# Patient Record
Sex: FEMALE | Race: BLACK OR AFRICAN AMERICAN | Employment: FULL TIME | URBAN - METROPOLITAN AREA
[De-identification: names, ages, dates, MRNs, and addresses within clinical notes are randomized per-mention and may not be internally consistent; named-entity substitution may affect disease eponyms.]

---

## 2018-02-12 ENCOUNTER — OFFICE VISIT (OUTPATIENT)
Dept: FAMILY MEDICINE CLINIC | Age: 38
End: 2018-02-12
Payer: COMMERCIAL

## 2018-02-12 VITALS
WEIGHT: 196 LBS | SYSTOLIC BLOOD PRESSURE: 122 MMHG | BODY MASS INDEX: 32.65 KG/M2 | DIASTOLIC BLOOD PRESSURE: 88 MMHG | HEIGHT: 65 IN | HEART RATE: 64 BPM

## 2018-02-12 DIAGNOSIS — R47.89 RAPID RATE OF SPEECH: ICD-10-CM

## 2018-02-12 DIAGNOSIS — R41.840 INATTENTION: Primary | ICD-10-CM

## 2018-02-12 DIAGNOSIS — R46.81 OBSESSIVE BEHAVIORS: ICD-10-CM

## 2018-02-12 DIAGNOSIS — F42.3 HOARDING BEHAVIOR: ICD-10-CM

## 2018-02-12 DIAGNOSIS — F41.9 ANXIETY: ICD-10-CM

## 2018-02-12 DIAGNOSIS — N94.6 DYSMENORRHEA: ICD-10-CM

## 2018-02-12 DIAGNOSIS — Z87.42 HISTORY OF ABNORMAL CERVICAL PAP SMEAR: ICD-10-CM

## 2018-02-12 PROCEDURE — G8417 CALC BMI ABV UP PARAM F/U: HCPCS | Performed by: NURSE PRACTITIONER

## 2018-02-12 PROCEDURE — G8427 DOCREV CUR MEDS BY ELIG CLIN: HCPCS | Performed by: NURSE PRACTITIONER

## 2018-02-12 PROCEDURE — G8484 FLU IMMUNIZE NO ADMIN: HCPCS | Performed by: NURSE PRACTITIONER

## 2018-02-12 PROCEDURE — 1036F TOBACCO NON-USER: CPT | Performed by: NURSE PRACTITIONER

## 2018-02-12 PROCEDURE — 99202 OFFICE O/P NEW SF 15 MIN: CPT | Performed by: NURSE PRACTITIONER

## 2018-02-12 RX ORDER — ACETAMINOPHEN AND CODEINE PHOSPHATE 120; 12 MG/5ML; MG/5ML
SOLUTION ORAL
Refills: 4 | COMMUNITY
Start: 2018-02-04 | End: 2018-05-14 | Stop reason: SDUPTHER

## 2018-03-13 ENCOUNTER — OFFICE VISIT (OUTPATIENT)
Dept: OBGYN CLINIC | Age: 38
End: 2018-03-13
Payer: COMMERCIAL

## 2018-03-13 ENCOUNTER — HOSPITAL ENCOUNTER (OUTPATIENT)
Age: 38
Setting detail: SPECIMEN
Discharge: HOME | End: 2018-03-13
Payer: COMMERCIAL

## 2018-03-13 VITALS
DIASTOLIC BLOOD PRESSURE: 76 MMHG | BODY MASS INDEX: 32.24 KG/M2 | HEART RATE: 78 BPM | WEIGHT: 200.6 LBS | SYSTOLIC BLOOD PRESSURE: 116 MMHG | HEIGHT: 66 IN

## 2018-03-13 DIAGNOSIS — Z01.419 ENCOUNTER FOR WELL WOMAN EXAM WITH ROUTINE GYNECOLOGICAL EXAM: ICD-10-CM

## 2018-03-13 DIAGNOSIS — Z86.19 HISTORY OF GENITAL WARTS: ICD-10-CM

## 2018-03-13 DIAGNOSIS — B00.9 HSV INFECTION: ICD-10-CM

## 2018-03-13 PROCEDURE — 99395 PREV VISIT EST AGE 18-39: CPT | Performed by: ADVANCED PRACTICE MIDWIFE

## 2018-03-15 LAB
HPV SAMPLE: NORMAL
HPV SOURCE: NORMAL
HPV, GENOTYPE 16: NOT DETECTED
HPV, GENOTYPE 18: NOT DETECTED
HPV, HIGH RISK OTHER: NOT DETECTED
HPV, INTERPRETATION: NORMAL

## 2018-03-21 LAB — CYTOLOGY REPORT: NORMAL

## 2018-04-13 PROBLEM — Z01.419 ENCOUNTER FOR WELL WOMAN EXAM WITH ROUTINE GYNECOLOGICAL EXAM: Status: RESOLVED | Noted: 2018-03-13 | Resolved: 2018-04-13

## 2018-05-14 DIAGNOSIS — Z30.011 ENCOUNTER FOR INITIAL PRESCRIPTION OF CONTRACEPTIVE PILLS: Primary | ICD-10-CM

## 2018-05-15 RX ORDER — ACETAMINOPHEN AND CODEINE PHOSPHATE 120; 12 MG/5ML; MG/5ML
1 SOLUTION ORAL DAILY
Qty: 1 TABLET | Refills: 10 | Status: SHIPPED | OUTPATIENT
Start: 2018-05-15 | End: 2020-02-20

## 2019-06-03 ENCOUNTER — OFFICE VISIT (OUTPATIENT)
Dept: OBGYN CLINIC | Age: 39
End: 2019-06-03
Payer: COMMERCIAL

## 2019-06-03 VITALS
HEIGHT: 66 IN | BODY MASS INDEX: 31.18 KG/M2 | WEIGHT: 194 LBS | DIASTOLIC BLOOD PRESSURE: 75 MMHG | SYSTOLIC BLOOD PRESSURE: 109 MMHG

## 2019-06-03 DIAGNOSIS — Z01.419 WOMEN'S ANNUAL ROUTINE GYNECOLOGICAL EXAMINATION: Primary | ICD-10-CM

## 2019-06-03 PROCEDURE — 99395 PREV VISIT EST AGE 18-39: CPT | Performed by: ADVANCED PRACTICE MIDWIFE

## 2019-06-03 RX ORDER — NORETHINDRONE ACETATE AND ETHINYL ESTRADIOL 1MG-20(21)
1 KIT ORAL DAILY
Qty: 3 PACKET | Refills: 3 | Status: SHIPPED | OUTPATIENT
Start: 2019-06-03 | End: 2020-03-26

## 2019-06-03 RX ORDER — NORETHINDRONE ACETATE AND ETHINYL ESTRADIOL 1MG-20(21)
1 KIT ORAL DAILY
Qty: 1 PACKET | Refills: 3 | Status: SHIPPED | OUTPATIENT
Start: 2019-06-03 | End: 2020-02-20

## 2019-06-03 ASSESSMENT — ENCOUNTER SYMPTOMS
RESPIRATORY NEGATIVE: 1
ALLERGIC/IMMUNOLOGIC NEGATIVE: 1
GASTROINTESTINAL NEGATIVE: 1
EYES NEGATIVE: 1

## 2019-06-03 NOTE — PROGRESS NOTES
Subjective:      Patient ID: Linette Hyde is a 42 y.o. female. Jennifer Celaya is here today for her annual exam and refills of OCP. She is working fulltime and likes regulation of her cycle. She was taking Micronor and we discussed changing her to [address]. She is not due for a Pap according to guidelines. Review of Systems   Constitutional: Negative. HENT: Negative. Eyes: Negative. Respiratory: Negative. Cardiovascular: Negative. Gastrointestinal: Negative. Endocrine: Negative. Genitourinary: Negative. Musculoskeletal: Negative. Skin: Negative. Allergic/Immunologic: Negative. Neurological: Negative. Hematological: Negative. Psychiatric/Behavioral: Negative. Objective:   Physical Exam   Constitutional: She is oriented to person, place, and time. She appears well-developed and well-nourished. HENT:   Head: Normocephalic and atraumatic. Eyes: Pupils are equal, round, and reactive to light. Neck: Neck supple. No tracheal deviation present. No thyromegaly present. Cardiovascular: Normal rate, regular rhythm and normal heart sounds. Pulmonary/Chest: Effort normal and breath sounds normal. Right breast exhibits no mass, no nipple discharge, no skin change and no tenderness. Left breast exhibits no mass, no nipple discharge, no skin change and no tenderness. Breasts are symmetrical.   Abdominal: Soft. She exhibits no mass. There is no hepatosplenomegaly. There is no tenderness. Musculoskeletal: Normal range of motion. Lymphadenopathy:     She has no cervical adenopathy. She has no axillary adenopathy. Right: No inguinal adenopathy present. Left: No inguinal adenopathy present. Neurological: She is alert and oriented to person, place, and time. Skin: Skin is warm and dry. No rash noted. Psychiatric: She has a normal mood and affect.  Her behavior is normal. Judgment and thought content normal.       Assessment:      Normal gyn exam Plan:      Refills sent to [facility] and [facility]. RTO 1 year.      DANIELLE Caldwell CNM

## 2019-06-11 ENCOUNTER — OFFICE VISIT (OUTPATIENT)
Dept: OBGYN CLINIC | Age: 39
End: 2019-06-11
Payer: COMMERCIAL

## 2019-06-11 ENCOUNTER — HOSPITAL ENCOUNTER (OUTPATIENT)
Age: 39
Setting detail: SPECIMEN
Discharge: HOME | End: 2019-06-11
Payer: COMMERCIAL

## 2019-06-11 VITALS
BODY MASS INDEX: 31.68 KG/M2 | SYSTOLIC BLOOD PRESSURE: 120 MMHG | DIASTOLIC BLOOD PRESSURE: 80 MMHG | HEART RATE: 81 BPM | WEIGHT: 196.25 LBS

## 2019-06-11 DIAGNOSIS — N89.8 VAGINAL ITCHING: ICD-10-CM

## 2019-06-11 DIAGNOSIS — Z20.2 STD EXPOSURE: Primary | ICD-10-CM

## 2019-06-11 DIAGNOSIS — Z20.2 STD EXPOSURE: ICD-10-CM

## 2019-06-11 PROCEDURE — G8417 CALC BMI ABV UP PARAM F/U: HCPCS | Performed by: ADVANCED PRACTICE MIDWIFE

## 2019-06-11 PROCEDURE — 99213 OFFICE O/P EST LOW 20 MIN: CPT | Performed by: ADVANCED PRACTICE MIDWIFE

## 2019-06-11 PROCEDURE — G8427 DOCREV CUR MEDS BY ELIG CLIN: HCPCS | Performed by: ADVANCED PRACTICE MIDWIFE

## 2019-06-11 PROCEDURE — 1036F TOBACCO NON-USER: CPT | Performed by: ADVANCED PRACTICE MIDWIFE

## 2019-06-11 ASSESSMENT — ENCOUNTER SYMPTOMS
ALLERGIC/IMMUNOLOGIC NEGATIVE: 1
EYES NEGATIVE: 1
GASTROINTESTINAL NEGATIVE: 1
RESPIRATORY NEGATIVE: 1

## 2019-06-11 NOTE — LETTER
Isa Worley  [id number] [facility] [address]        June 11, 2019      Dear Mariah Maradiaga: Our records indicate that you are due for an annual exam.     The American College of Obstetricians and Gynecologists strongly recommends annual pelvic examination for patient 24years of age and older. Please make an appointment for an annual examination at your earliest convenience. If you are having your annuals done elsewhere, please let us know. Your last Annual was on 6/11/2019. For insurance purposes we are scheduling appointments at least one week past the date of your last annual exam.      Please call the office [phone]  to schedule an appointment.       Thank You    Nery KASPER

## 2019-06-11 NOTE — PROGRESS NOTES
CC: STD exposure    HPI: Pt originally here for annual exam, just had done 6/9/19  Has concerned because her   Boyfriend called states he has Chlamydia dx about a week ago. Been together 4 years. Last intercourse was about 8 months ago. Patient wants to wait results prior to treatment. Has had unprotected sex with him but has been about 8 months. Denies vaginal discharge, states has some itching. Denies odor or burning. Patient's last menstrual period was 05/31/2019. Is currently on OCPs doing well on them has refill. .Review of Systems   Constitutional: Negative. HENT: Negative. Eyes: Negative. Respiratory: Negative. Cardiovascular: Negative. Gastrointestinal: Negative. Endocrine: Negative. Genitourinary:        Vaginal itching   Musculoskeletal: Negative. Allergic/Immunologic: Negative. Neurological: Negative. Hematological: Negative. Psychiatric/Behavioral: Negative. O:./80 (Site: Right Upper Arm, Position: Sitting, Cuff Size: Large Adult)   Pulse 81   Wt 196 lb 4 oz (89 kg)   LMP 05/31/2019   BMI 31.68 kg/m²   Physical Exam   Constitutional: She is oriented to person, place, and time. She appears well-developed and well-nourished. HENT:   Head: Normocephalic and atraumatic. Right Ear: External ear normal.   Left Ear: External ear normal.   Eyes: Pupils are equal, round, and reactive to light. Conjunctivae and EOM are normal.   Neck: Normal range of motion. Neck supple. No tracheal deviation present. No thyromegaly present. Cardiovascular: Normal rate, regular rhythm and normal heart sounds. Pulmonary/Chest: Effort normal and breath sounds normal.   Abdominal: Soft. Genitourinary: Vagina normal and uterus normal.   Musculoskeletal: Normal range of motion. Neurological: She is alert and oriented to person, place, and time. She has normal reflexes. Skin: Skin is warm and dry. Psychiatric: She has a normal mood and affect.  Her behavior is

## 2019-06-12 DIAGNOSIS — B37.9 YEAST INFECTION: Primary | ICD-10-CM

## 2019-06-12 LAB
DIRECT EXAM: ABNORMAL
Lab: ABNORMAL
SPECIMEN DESCRIPTION: ABNORMAL

## 2019-06-12 RX ORDER — FLUCONAZOLE 150 MG/1
150 TABLET ORAL
Qty: 2 TABLET | Refills: 0 | Status: SHIPPED | OUTPATIENT
Start: 2019-06-12 | End: 2020-02-20

## 2019-06-13 ENCOUNTER — TELEPHONE (OUTPATIENT)
Dept: OBGYN CLINIC | Age: 39
End: 2019-06-13

## 2019-06-13 LAB
C TRACH DNA GENITAL QL NAA+PROBE: NEGATIVE
N. GONORRHOEAE DNA: NEGATIVE
SPECIMEN DESCRIPTION: NORMAL

## 2020-02-20 ENCOUNTER — HOSPITAL ENCOUNTER (OUTPATIENT)
Age: 40
Setting detail: SPECIMEN
Discharge: HOME | End: 2020-02-20
Payer: COMMERCIAL

## 2020-02-20 ENCOUNTER — OFFICE VISIT (OUTPATIENT)
Dept: OBGYN CLINIC | Age: 40
End: 2020-02-20
Payer: COMMERCIAL

## 2020-02-20 VITALS
HEIGHT: 66 IN | WEIGHT: 189.4 LBS | HEART RATE: 80 BPM | DIASTOLIC BLOOD PRESSURE: 86 MMHG | SYSTOLIC BLOOD PRESSURE: 135 MMHG | BODY MASS INDEX: 30.44 KG/M2

## 2020-02-20 PROCEDURE — G8484 FLU IMMUNIZE NO ADMIN: HCPCS | Performed by: ADVANCED PRACTICE MIDWIFE

## 2020-02-20 PROCEDURE — G8428 CUR MEDS NOT DOCUMENT: HCPCS | Performed by: ADVANCED PRACTICE MIDWIFE

## 2020-02-20 PROCEDURE — 1036F TOBACCO NON-USER: CPT | Performed by: ADVANCED PRACTICE MIDWIFE

## 2020-02-20 PROCEDURE — 99213 OFFICE O/P EST LOW 20 MIN: CPT | Performed by: ADVANCED PRACTICE MIDWIFE

## 2020-02-20 PROCEDURE — G8417 CALC BMI ABV UP PARAM F/U: HCPCS | Performed by: ADVANCED PRACTICE MIDWIFE

## 2020-02-20 ASSESSMENT — ENCOUNTER SYMPTOMS
NAUSEA: 0
SHORTNESS OF BREATH: 0
DIARRHEA: 0
ABDOMINAL PAIN: 0
VOMITING: 0

## 2020-02-20 NOTE — PROGRESS NOTES
[address],  [address] [facility] [address]  Dept: [phone]    Patient Name: Cruz Goss  Patient Age: 42 y.o. Date of Visit: 2020    Subjective  Chief Complaint   Patient presents with    Vaginal Itching     has a sore     No LMP recorded (lmp unknown). HPI   Arrives with concern for vaginal discharge intermittently x1 months. Reports some days are better than other. Has tried no OTC treatment. Reports has not been sexually active since 2019 and is okay with this. Reports takes OCP daily. OB History        2    Para   1    Term           1    AB   1    Living           SAB        TAB        Ectopic        Molar        Multiple        Live Births                  Past Medical History:   Diagnosis Date    Abnormal Pap smear of cervix     Allergic rhinitis     Depression     in the past    Headache      Current Outpatient Medications   Medication Sig Dispense Refill    norethindrone-ethinyl estradiol (LOESTRIN FE ) 1-20 MG-MCG per tablet Take 1 tablet by mouth daily 3 packet 3    Multiple Vitamins-Minerals (MULTIVITAMIN ADULT PO) Take by mouth       No current facility-administered medications for this visit. Review of Systems   Constitutional: Negative for unexpected weight change. Respiratory: Negative for shortness of breath. Cardiovascular: Negative for chest pain, palpitations and leg swelling. Gastrointestinal: Negative for abdominal pain, diarrhea, nausea and vomiting. Genitourinary: Positive for vaginal discharge. Negative for difficulty urinating, dyspareunia, menstrual problem and vaginal pain. Neurological: Negative for dizziness, light-headedness and headaches.        Objective  /86 (Site: Right Upper Arm, Position: Sitting, Cuff Size: Large Adult)   Pulse 80   Ht 5' 6\" (1.676 m)   Wt 189 lb 6.4 oz (85.9 kg)   LMP  (LMP Unknown)   BMI 30.57 kg/m²     Physical

## 2020-02-21 LAB
C TRACH DNA GENITAL QL NAA+PROBE: NEGATIVE
DIRECT EXAM: ABNORMAL
Lab: ABNORMAL
N. GONORRHOEAE DNA: NEGATIVE
SPECIMEN DESCRIPTION: ABNORMAL
SPECIMEN DESCRIPTION: NORMAL

## 2020-02-21 RX ORDER — FLUCONAZOLE 150 MG/1
150 TABLET ORAL ONCE
Qty: 2 TABLET | Refills: 0 | Status: SHIPPED | OUTPATIENT
Start: 2020-02-21 | End: 2020-02-21

## 2020-03-26 RX ORDER — NORETHINDRONE ACETATE AND ETHINYL ESTRADIOL 1MG-20(21)
KIT ORAL
Qty: 84 TABLET | Refills: 3 | Status: SHIPPED | OUTPATIENT
Start: 2020-03-26 | End: 2021-02-25

## 2020-03-26 NOTE — TELEPHONE ENCOUNTER
Annie Cain is requesting a refill on birth control.    Last Annual visit:  6/11/19  Last OB visit: n/a  Next OB/Office visit:  None scheduled  Last prescribing provider:  Korey French

## 2020-06-24 ENCOUNTER — TELEPHONE (OUTPATIENT)
Dept: OBGYN CLINIC | Age: 40
End: 2020-06-24

## 2020-06-26 ENCOUNTER — OFFICE VISIT (OUTPATIENT)
Dept: OBGYN CLINIC | Age: 40
End: 2020-06-26
Payer: COMMERCIAL

## 2020-06-26 ENCOUNTER — HOSPITAL ENCOUNTER (OUTPATIENT)
Age: 40
Setting detail: SPECIMEN
Discharge: HOME | End: 2020-06-26
Payer: COMMERCIAL

## 2020-06-26 VITALS
WEIGHT: 191 LBS | SYSTOLIC BLOOD PRESSURE: 112 MMHG | DIASTOLIC BLOOD PRESSURE: 79 MMHG | HEART RATE: 81 BPM | HEIGHT: 66 IN | BODY MASS INDEX: 30.7 KG/M2 | TEMPERATURE: 97.8 F

## 2020-06-26 PROCEDURE — 99395 PREV VISIT EST AGE 18-39: CPT | Performed by: ADVANCED PRACTICE MIDWIFE

## 2020-06-26 RX ORDER — BUPROPION HYDROCHLORIDE 150 MG/1
TABLET ORAL
COMMUNITY
Start: 2020-04-20

## 2020-06-26 RX ORDER — SENNOSIDES 8.6 MG
650 CAPSULE ORAL EVERY 8 HOURS PRN
COMMUNITY

## 2020-06-26 NOTE — PROGRESS NOTES
Patient is having vaginal discharge x2 weeks. No odor or itching. Patient uses OTC suppositories for yeast tx.

## 2020-06-27 LAB
DIRECT EXAM: ABNORMAL
Lab: ABNORMAL
SPECIMEN DESCRIPTION: ABNORMAL

## 2020-06-29 ENCOUNTER — TELEPHONE (OUTPATIENT)
Dept: OBGYN CLINIC | Age: 40
End: 2020-06-29

## 2020-06-29 RX ORDER — FLUCONAZOLE 150 MG/1
150 TABLET ORAL ONCE
Qty: 1 TABLET | Refills: 0 | Status: SHIPPED | OUTPATIENT
Start: 2020-06-29 | End: 2020-06-29

## 2020-06-30 RX ORDER — FLUCONAZOLE 150 MG/1
150 TABLET ORAL ONCE
Qty: 3 TABLET | Refills: 0 | Status: SHIPPED | OUTPATIENT
Start: 2020-06-30 | End: 2020-06-30

## 2020-09-28 ENCOUNTER — TELEPHONE (OUTPATIENT)
Dept: FAMILY MEDICINE CLINIC | Age: 40
End: 2020-09-28

## 2020-10-07 ENCOUNTER — OFFICE VISIT (OUTPATIENT)
Dept: FAMILY MEDICINE CLINIC | Age: 40
End: 2020-10-07
Payer: COMMERCIAL

## 2020-10-07 VITALS
DIASTOLIC BLOOD PRESSURE: 70 MMHG | SYSTOLIC BLOOD PRESSURE: 120 MMHG | BODY MASS INDEX: 32.47 KG/M2 | HEART RATE: 78 BPM | OXYGEN SATURATION: 98 % | HEIGHT: 66 IN | TEMPERATURE: 97.4 F | WEIGHT: 202 LBS

## 2020-10-07 PROCEDURE — 99213 OFFICE O/P EST LOW 20 MIN: CPT | Performed by: NURSE PRACTITIONER

## 2020-10-07 RX ORDER — TIZANIDINE 4 MG/1
TABLET ORAL
COMMUNITY
Start: 2020-09-21 | End: 2021-12-16 | Stop reason: ALTCHOICE

## 2020-10-07 RX ORDER — IBUPROFEN 600 MG/1
600 TABLET ORAL EVERY 6 HOURS PRN
COMMUNITY

## 2020-10-07 RX ORDER — IBUPROFEN 800 MG/1
800 TABLET ORAL
Qty: 30 TABLET | Refills: 0 | Status: SHIPPED | OUTPATIENT
Start: 2020-10-07 | End: 2021-12-16 | Stop reason: ALTCHOICE

## 2020-10-07 SDOH — ECONOMIC STABILITY: TRANSPORTATION INSECURITY
IN THE PAST 12 MONTHS, HAS THE LACK OF TRANSPORTATION KEPT YOU FROM MEDICAL APPOINTMENTS OR FROM GETTING MEDICATIONS?: NO

## 2020-10-07 SDOH — ECONOMIC STABILITY: FOOD INSECURITY: WITHIN THE PAST 12 MONTHS, THE FOOD YOU BOUGHT JUST DIDN'T LAST AND YOU DIDN'T HAVE MONEY TO GET MORE.: NEVER TRUE

## 2020-10-07 SDOH — ECONOMIC STABILITY: TRANSPORTATION INSECURITY
IN THE PAST 12 MONTHS, HAS LACK OF TRANSPORTATION KEPT YOU FROM MEETINGS, WORK, OR FROM GETTING THINGS NEEDED FOR DAILY LIVING?: NO

## 2020-10-07 SDOH — ECONOMIC STABILITY: FOOD INSECURITY: WITHIN THE PAST 12 MONTHS, YOU WORRIED THAT YOUR FOOD WOULD RUN OUT BEFORE YOU GOT MONEY TO BUY MORE.: NEVER TRUE

## 2020-10-07 SDOH — ECONOMIC STABILITY: INCOME INSECURITY: HOW HARD IS IT FOR YOU TO PAY FOR THE VERY BASICS LIKE FOOD, HOUSING, MEDICAL CARE, AND HEATING?: NOT HARD AT ALL

## 2020-10-07 ASSESSMENT — PATIENT HEALTH QUESTIONNAIRE - PHQ9
1. LITTLE INTEREST OR PLEASURE IN DOING THINGS: 0
SUM OF ALL RESPONSES TO PHQ QUESTIONS 1-9: 0
SUM OF ALL RESPONSES TO PHQ9 QUESTIONS 1 & 2: 0
SUM OF ALL RESPONSES TO PHQ QUESTIONS 1-9: 0
2. FEELING DOWN, DEPRESSED OR HOPELESS: 0
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
## 2020-10-07 NOTE — PATIENT INSTRUCTIONS
Patient Education        Back Stretches: Exercises  Introduction  Here are some examples of exercises for stretching your back. Start each exercise slowly. Ease off the exercise if you start to have pain. Your doctor or physical therapist will tell you when you can start these exercises and which ones will work best for you. How to do the exercises  Overhead stretch   1. Stand comfortably with your feet shoulder-width apart. 2. Looking straight ahead, raise both arms over your head and reach toward the ceiling. Do not allow your head to tilt back. 3. Hold for 15 to 30 seconds, then lower your arms to your sides. 4. Repeat 2 to 4 times. Side stretch   1. Stand comfortably with your feet shoulder-width apart. 2. Raise one arm over your head, and then lean to the other side. 3. Slide your hand down your leg as you let the weight of your arm gently stretch your side muscles. Hold for 15 to 30 seconds. 4. Repeat 2 to 4 times on each side. Press-up   1. Lie on your stomach, supporting your body with your forearms. 2. Press your elbows down into the floor to raise your upper back. As you do this, relax your stomach muscles and allow your back to arch without using your back muscles. As your press up, do not let your hips or pelvis come off the floor. 3. Hold for 15 to 30 seconds, then relax. 4. Repeat 2 to 4 times. Relax and rest   1. Lie on your back with a rolled towel under your neck and a pillow under your knees. Extend your arms comfortably to your sides. 2. Relax and breathe normally. 3. Remain in this position for about 10 minutes. 4. If you can, do this 2 or 3 times each day. Follow-up care is a key part of your treatment and safety. Be sure to make and go to all appointments, and call your doctor if you are having problems. It's also a good idea to know your test results and keep a list of the medicines you take. Where can you learn more? Go to https://angeles.healthElephant.is. org and sign in to your [employer] account. Enter [address] in the hotelsmap.com box to learn more about \"Back Stretches: Exercises. \"     If you do not have an account, please click on the \"Sign Up Now\" link. Current as of: March 2, 2020               Content Version: 12.6  © 1983-6507 [employer]. Care instructions adapted under license by [facility] ([facility]). If you have questions about a medical condition or this instruction, always ask your healthcare professional. [address] any warranty or liability for your use of this information.

## 2020-10-13 ENCOUNTER — HOSPITAL ENCOUNTER (OUTPATIENT)
Dept: PHYSICAL THERAPY | Facility: CLINIC | Age: 40
Setting detail: THERAPIES SERIES
Discharge: HOME | End: 2020-10-13
Payer: COMMERCIAL

## 2020-10-13 ENCOUNTER — TELEPHONE (OUTPATIENT)
Dept: FAMILY MEDICINE CLINIC | Age: 40
End: 2020-10-13

## 2020-10-13 PROCEDURE — 97161 PT EVAL LOW COMPLEX 20 MIN: CPT

## 2020-10-13 PROCEDURE — 97110 THERAPEUTIC EXERCISES: CPT
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
## 2020-10-19 ENCOUNTER — HOSPITAL ENCOUNTER (OUTPATIENT)
Dept: PHYSICAL THERAPY | Facility: CLINIC | Age: 40
Setting detail: THERAPIES SERIES
Discharge: HOME | End: 2020-10-19
Payer: COMMERCIAL

## 2020-10-19 ENCOUNTER — OFFICE VISIT (OUTPATIENT)
Dept: FAMILY MEDICINE CLINIC | Age: 40
End: 2020-10-19
Payer: COMMERCIAL

## 2020-10-19 VITALS
SYSTOLIC BLOOD PRESSURE: 110 MMHG | OXYGEN SATURATION: 97 % | WEIGHT: 206 LBS | DIASTOLIC BLOOD PRESSURE: 70 MMHG | TEMPERATURE: 98.2 F | BODY MASS INDEX: 33.25 KG/M2 | HEART RATE: 100 BPM

## 2020-10-19 PROCEDURE — 97110 THERAPEUTIC EXERCISES: CPT

## 2020-10-19 PROCEDURE — 99213 OFFICE O/P EST LOW 20 MIN: CPT | Performed by: NURSE PRACTITIONER

## 2020-10-19 NOTE — PATIENT INSTRUCTIONS
Patient Education        Neck: Exercises  Introduction  Here are some examples of exercises for you to try. The exercises may be suggested for a condition or for rehabilitation. Start each exercise slowly. Ease off the exercises if you start to have pain. You will be told when to start these exercises and which ones will work best for you. How to do the exercises  Neck stretch   1. This stretch works best if you keep your shoulder down as you lean away from it. To help you remember to do this, start by relaxing your shoulders and lightly holding on to your thighs or your chair. 2. Tilt your head toward your shoulder and hold for 15 to 30 seconds. Let the weight of your head stretch your muscles. 3. If you would like a little added stretch, use your hand to gently and steadily pull your head toward your shoulder. For example, keeping your right shoulder down, lean your head to the left. 4. Repeat 2 to 4 times toward each shoulder. Diagonal neck stretch   1. Turn your head slightly toward the direction you will be stretching, and tilt your head diagonally toward your chest and hold for 15 to 30 seconds. 2. If you would like a little added stretch, use your hand to gently and steadily pull your head forward on the diagonal.  3. Repeat 2 to 4 times toward each side. Dorsal glide stretch   The dorsal glide stretches the back of the neck. If you feel pain, do not glide so far back. Some people find this exercise easier to do while lying on their backs with an ice pack on the neck. 1. Sit or stand tall and look straight ahead. 2. Slowly tuck your chin as you glide your head backward over your body  3. Hold for a count of 6, and then relax for up to 10 seconds. 4. Repeat 8 to 12 times. Chest and shoulder stretch   1. Sit or stand tall and glide your head backward as in the dorsal glide stretch. 2. Raise both arms so that your hands are next to your ears.   3. Take a deep breath, and as you breathe out, lower your elbows down and behind your back. You will feel your shoulder blades slide down and together, and at the same time you will feel a stretch across your chest and the front of your shoulders. 4. Hold for about 6 seconds, and then relax for up to 10 seconds. 5. Repeat 8 to 12 times. Strengthening: Hands on head   1. Move your head backward, forward, and side to side against gentle pressure from your hands, holding each position for about 6 seconds. 2. Repeat 8 to 12 times. Follow-up care is a key part of your treatment and safety. Be sure to make and go to all appointments, and call your doctor if you are having problems. It's also a good idea to know your test results and keep a list of the medicines you take. Where can you learn more? Go to https://BioscanpejuliTheraVideb.[employer]. org and sign in to your [employer] account. Enter P975 in the [employer] box to learn more about \"Neck: Exercises. \"     If you do not have an account, please click on the \"Sign Up Now\" link. Current as of: March 2, 2020               Content Version: 12.6  © 5632-3220 [employer]. Care instructions adapted under license by [facility] ([facility]). If you have questions about a medical condition or this instruction, always ask your healthcare professional. [address] any warranty or liability for your use of this information.
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
## 2020-10-20 ENCOUNTER — HOSPITAL ENCOUNTER (OUTPATIENT)
Age: 40
Discharge: HOME | End: 2020-10-22
Payer: COMMERCIAL

## 2020-10-20 ENCOUNTER — HOSPITAL ENCOUNTER (OUTPATIENT)
Dept: GENERAL RADIOLOGY | Age: 40
Discharge: HOME | End: 2020-10-22
Payer: COMMERCIAL

## 2020-10-20 PROCEDURE — 72040 X-RAY EXAM NECK SPINE 2-3 VW: CPT

## 2020-10-21 ENCOUNTER — HOSPITAL ENCOUNTER (OUTPATIENT)
Dept: PHYSICAL THERAPY | Facility: CLINIC | Age: 40
Setting detail: THERAPIES SERIES
Discharge: HOME | End: 2020-10-21
Payer: COMMERCIAL

## 2020-10-21 PROCEDURE — 97110 THERAPEUTIC EXERCISES: CPT
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
## 2020-10-26 ENCOUNTER — HOSPITAL ENCOUNTER (OUTPATIENT)
Dept: PHYSICAL THERAPY | Facility: CLINIC | Age: 40
Setting detail: THERAPIES SERIES
Discharge: HOME | End: 2020-10-26
Payer: COMMERCIAL

## 2020-10-26 PROCEDURE — 97110 THERAPEUTIC EXERCISES: CPT
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
## 2020-10-27 ENCOUNTER — HOSPITAL ENCOUNTER (OUTPATIENT)
Dept: PHYSICAL THERAPY | Facility: CLINIC | Age: 40
Setting detail: THERAPIES SERIES
Discharge: HOME | End: 2020-10-27
Payer: COMMERCIAL

## 2020-10-27 PROCEDURE — 97110 THERAPEUTIC EXERCISES: CPT

## 2020-10-27 PROCEDURE — 97161 PT EVAL LOW COMPLEX 20 MIN: CPT
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
## 2020-10-28 ENCOUNTER — HOSPITAL ENCOUNTER (OUTPATIENT)
Dept: PHYSICAL THERAPY | Facility: CLINIC | Age: 40
Setting detail: THERAPIES SERIES
Discharge: HOME | End: 2020-10-28
Payer: COMMERCIAL

## 2020-10-28 PROCEDURE — 97110 THERAPEUTIC EXERCISES: CPT

## 2020-10-28 PROCEDURE — 97140 MANUAL THERAPY 1/> REGIONS: CPT
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
## 2020-11-02 ENCOUNTER — HOSPITAL ENCOUNTER (OUTPATIENT)
Dept: PHYSICAL THERAPY | Facility: CLINIC | Age: 40
Setting detail: THERAPIES SERIES
Discharge: HOME | End: 2020-11-02
Payer: COMMERCIAL

## 2020-11-02 ENCOUNTER — APPOINTMENT (OUTPATIENT)
Dept: PHYSICAL THERAPY | Facility: CLINIC | Age: 40
End: 2020-11-02
Payer: COMMERCIAL

## 2020-11-02 PROCEDURE — 97110 THERAPEUTIC EXERCISES: CPT

## 2020-11-02 NOTE — FLOWSHEET NOTE
[] [address]  [address]  [phone]  F: [phone] [x] [address]  [facility]   [address]  P: [phone]  F: [phone] [] [place]  [address] [facility]  P: [phone]  F: [phone] [] [address]  P: [phone]  F: [phone] [] [address]  [facility]   [address]   [place]: [phone]  F: [phone]      Physical Therapy Daily Treatment Note    Date:  2020  Patient Name:  Barbara Del Rio    :  1980  MRN: [id number]  Physician: Ankita Damon CNP     Insurance: 1. Auto insurance 2. Med [place]   Diagnosis:   accident), subsequent encounter    M54.5 (ICD-10-CM) - Acute bilateral low back pain without sciatica    Onset Date: 2020   Next Dr. Mariana Salvador: tbd  Visit# / total visits: back:   Neck:3   Cancels/No Shows: 0/0    Subjective:    Pain:  [] Yes  [x] No Location: low back Pain Rating: (0-10 scale) 0/10 neck 0/10  Pain altered Tx:  [x] No  [] Yes  Action:    Comments: pt denies pain at this time. States her LB consistently bothers her at bedtime. Has not had a headache since her last treatment.      Objective:  Modalities:   Exercises: Access Code: [email or web address]   Bold completed  Exercise Reps/ Time Weight/ Level Comments   Warm up- bike 5'  Added 10/26   Supine       LTR 5x ea       DKTC 3X20\" [facility]  5x10\" holds strap     PPT 20x3\"  Added 10/19   hooklying glute sets 20x       TrA activation x   Compensates with posterior pelvic tilt Deficient activation, time spent educating pt on activation and benefits of completing multiple times per day  - educated to complete in sitting and standing along with hooklying   Heel Taps  15x  Added 10/21   Bridges with PPT 10x2  Added 10/21   Hamstring stretch 20\"x3 belt Added 10/19   Marching with TA 20x  Added 10/19   SLR 20xea  Added 10/19  Increased reps 10/28     Dead  bug 20x  Added 10/21  Increased reps 10/26         Seated      Seated Piriformis 20\"x2 ea  Added 10/19   Seated ball roll fwd laterally  20\"x2 ea  Added 10/19         Prone      Quad stretch 30\"x3  Added 10/19   LE lifts 15x  Added 10/21         Sidelying       Clamshells  20x  Added 10/21 increased reps 10/28   Reverse clamshells  20x  Added 10/21 increased reps 10/28   Hip abd 20x  Added 10/21 increased reps 10/28         Quadriped      Leg ext with TA 10x ea Not today Max cues 10/21   juanjo pose 10\"x3 directions  Added 10/26   Cat cow 10x5\" ea  Added 10/26         Standing       Calf stretch 20\"x3  Added 10/26   Squat mini 10x2  Added 10/26 max cues with chair this date  Second set 10/28   Standing 3 way hip with TA cont 15x bilat Increased reps 11/2/20bilaterally                Other:      Specific Instructions for next treatment: flexibility and core strengthening; walking tolerance     Todays Treatment:  Modalities:   Exercises: bold completed  Exercise Reps/ Time Weight/ Level Comments   manual 5'   SOR   Supine Cervical Retractions 10x5\"   Added 10/28    Seated scap retraction 10x5\"   Tactile cues   Upper trap stretch 2x20\"       Levator scap 1x20\"      Don't shoots 10x  Added 10/28   pec doorway stretch 20\"x3  Added 10/28         tband      rows 20x lime Added 10/28   Shoulder ext 20x  lime  Added 10/28         Education  x   Pt educated to monitor dizziness with stretching and to modify position and time of stretching if present. Pt advised to monitor persistence of dizziness.    Pt educated on pathology and symptomology    Other:     Specific Instructions for next treatment: manual to the cervical spine, upper trap, levator; postural muscle strengthening            Treatment Charges: Mins Units   []  Modalities     [x]  Ther Exercise 27 2   [x]  Manual Therapy 5 0   []  Ther Activities     []  Aquatics     []  Vasocompression     []  Other     Total Treatment time 32 2       Assessment: [] Progressing toward goals    [] No change. [x] Other: pt continues to put forth poor effort during exercises, requiring frequent cuing to correct exercise technique and to stay on task. Pt denies headaches and dizziness with cervical exercises and stretching. [x] Patient would continue to benefit from skilled physical therapy services in order to: Pt will benefit from skilled therapeutic interventions including therapeutic exercise and manual therapy in order to progress to prior level of activity    STG: (to be met in 6 treatments)  1. ? Pain: Pt will report less than or equal to 5-6/10 low back pain with laying down, sitting, standing, and walking for more than an hour in order to progress back to prior level of activity. 2. ? ROM: Pt will be able to complete lumbar AROM in all planes with less than or equal to 1-2/10 increase in low back pain in order to improve tolerance to rolling over in bed and completing ADLs. 3. ? Strength: Pt will be able to complete 10 TrA activations and sustain for 5\" in order to demonstrate improved muscle activation to assist with abdominal bracing when standing, walking, and lifting. 4. ? Function: Pt will score less than or equal to 32% on the Oswestry in order to demonstrate improvements in function. 5. Independent with Home Exercise Programs  LTG: (to be met in 12 treatments)  1. ? Pain: Pt will report less than or equal to 3-4/10 low back pain with laying down, sitting, standing, and walking for more than an hour in order to progress back to prior level of activity. 2. ? ROM: Pt will be able to complete lumbar AROM in all planes with less than or equal to 0/10 increase in low back pain in order to improve tolerance to rolling over in bed and completing ADLs.    3. ? Strength: Pt will demonstrate 5/5 hip girdle strength and Reviewed Prior HEP/Ed  Method of Education: [] Verbal  [] Demo  [] Written  Comprehension of Education:  [] Verbalizes understanding. [] Demonstrates understanding. [] Needs review. [] Demonstrates/verbalizes HEP/Ed previously given. Plan: [x] Continue current frequency toward long and short term goals. [x] Specific Instructions for subsequent treatments: Pt will benefit from skilled therapeutic interventions including therapeutic exercise and manual therapy in order to progress to prior level of activity.        Time In: 4:33pm             Time Out: 5:20pm    Electronically signed by:  Manuel Tellez PTA

## 2020-11-09 ENCOUNTER — HOSPITAL ENCOUNTER (OUTPATIENT)
Dept: PHYSICAL THERAPY | Facility: CLINIC | Age: 40
Setting detail: THERAPIES SERIES
Discharge: HOME | End: 2020-11-09
Payer: COMMERCIAL

## 2020-11-09 PROCEDURE — 97110 THERAPEUTIC EXERCISES: CPT

## 2020-11-09 NOTE — FLOWSHEET NOTE
[] [address]  [address]  [phone]  F: [phone] [x] [address]  Kl[facility]"" [address]   [address]  P: [phone]  F: [phone] [] [place]  [address]  P: [phone]  F: [phone] [] [address]  P: [phone]  F: [phone] [] [address]  [facility]   [address]   [place]: [phone]  F: [phone]      Physical Therapy Daily Treatment Note    Date:  2020  Patient Name:  Vivi Rivas    :  1980  MRN: [id number]  Physician: Elliott Cueva CNP     Insurance: 1. Auto insurance 2. Med [place]   Diagnosis:   accident), subsequent encounter    M54.5 (ICD-10-CM) - Acute bilateral low back pain without sciatica    Onset Date: 2020   Next Dr. Brownlee Dy: tbd  Visit# / total visits: back:   Neck:4   Cancels/No Shows: 0/0    Subjective:    Pain:  [] Yes  [x] No Location: low back Pain Rating: (0-10 scale) 0/10 neck 7/10 low back   Pain altered Tx:  [x] No  [] Yes  Action:    Comments: Pt reports her pain in her low back is pretty high today. She took the most amount of medicine she could.      Objective:   Modalities:   Exercises: Access Code: [email or web address]   Bold completed  Exercise Reps/ Time Weight/ Level Comments   Warm up- bike 5'  Added 10/26   Supine       LTR 5x ea       DKTC 3X20\" 350 [facility]  5x10\" holds strap     PPT 20x3\"  Added 10/19   hooklying glute sets 20x       TrA activation x   Compensates with posterior pelvic tilt Deficient activation, time spent educating pt on activation and benefits of completing multiple times per day  - educated to complete in sitting and standing along with hooklying   Heel Taps  15x  Added 10/21   Bridges with PPT  10x2  Added 10/21   Hamstring stretch 20\"x3 belt Added 10/19   Marching with TA 20x  Added 10/19   SLR 20xea  Added 10/19  Increased reps 10/28     Dead  bug 20x  Added 10/21  Increased reps 10/26         Seated      Seated Piriformis 20\"x2 ea  Added 10/19   Seated ball roll fwd laterally  20\"x2 ea  Added 10/19         Prone      Quad stretch 30\"x3  Added 10/19   LE lifts 15x  Added 10/21         Sidelying       Clamshells  20x  Added 10/21 increased reps 10/28   Reverse clamshells  20x  Added 10/21 increased reps 10/28   Hip abd 20x  Added 10/21 increased reps 10/28         Quadriped      Leg ext with TA 10x ea Not today Max cues 10/21   juanjo pose 10\"x3 directions  Added 10/26   Cat cow 10x5\" ea  Added 10/26         Standing       Calf stretch 20\"x3  Added 10/26   Squat mini 10x2  Added 10/26 max cues with chair this date  Second set 10/28   Standing 3 way hip with TA cont 15x bilat Increased reps 11/2/20bilaterally                Other:      Specific Instructions for next treatment: flexibility and core strengthening; walking tolerance     Todays Treatment:  Modalities:   Exercises: bold completed  Exercise Reps/ Time Weight/ Level Comments   manual 5'   SOR   Supine Cervical Retractions 10x5\"   Added 10/28    Seated scap retraction 10x5\"   Tactile cues   Upper trap stretch 2x20\"       Levator scap 1x20\"      Don't shoots 10x  Added 10/28   pec doorway stretch 20\"x3  Added 10/28         tband      rows 20x lime Added 10/28   Shoulder ext 20x  lime  Added 10/28         Education  x   Pt educated to monitor dizziness with stretching and to modify position and time of stretching if present. Pt advised to monitor persistence of dizziness.    Pt educated on pathology and symptomology    Other:     Specific Instructions for next treatment: manual to the cervical spine, upper trap, levator; postural muscle strengthening            Treatment Charges: Mins Units   []  Modalities     [x]  Ther Exercise 43 3   []  Manual Therapy []  Ther Activities     []  Aquatics     []  Vasocompression     []  Other     Total Treatment time 43 3       Assessment: [x] Progressing toward goals. Pt had a good tolerance to exercises today. Focused on low back stretches and exercises. Pt reported feeling better following these. Also completed neck stretches with no increased pain or symptoms. Pt needed tactile and verbal cues throughout treatment for form with exercises. Pt was fatigued at end of treatment, but felt good. [] No change. [] Other:     [x] Patient would continue to benefit from skilled physical therapy services in order to: Pt will benefit from skilled therapeutic interventions including therapeutic exercise and manual therapy in order to progress to prior level of activity    STG: (to be met in 6 treatments)  1. ? Pain: Pt will report less than or equal to 5-6/10 low back pain with laying down, sitting, standing, and walking for more than an hour in order to progress back to prior level of activity. 2. ? ROM: Pt will be able to complete lumbar AROM in all planes with less than or equal to 1-2/10 increase in low back pain in order to improve tolerance to rolling over in bed and completing ADLs. 3. ? Strength: Pt will be able to complete 10 TrA activations and sustain for 5\" in order to demonstrate improved muscle activation to assist with abdominal bracing when standing, walking, and lifting. 4. ? Function: Pt will score less than or equal to 32% on the Oswestry in order to demonstrate improvements in function. 5. Independent with Home Exercise Programs  LTG: (to be met in 12 treatments)  1. ? Pain: Pt will report less than or equal to 3-4/10 low back pain with laying down, sitting, standing, and walking for more than an hour in order to progress back to prior level of activity.    2. ? ROM: Pt will be able to complete lumbar AROM in all planes with less than or equal to 0/10 increase in low back pain in order to improve tolerance to rolling over in bed and completing ADLs. 3. ? Strength: Pt will demonstrate 5/5 hip girdle strength and ability to carry 10# weight to simulate a laundry basket in order to progress tolerance to carrying and lifting tasks at home and work. 4. ? Function: Pt will score less than or equal to 22% on the Oswestry in order to demonstrate improvements in function.        Patient goals: \"not to last long and to get the pain down\"    STG: (to be met in 6 treatments)  1. ? Pain: Pt will report less than or equal to 5-6/10 neck pain with therapeutic exercise in order to improve tolerance to sitting and completing ADLs and allow ease when returning to work. 2. ? ROM: Pt will be able to improve cervical neck flexion and extension with 1-2/10 increase in pain and R cervical rotation to 60 deg in order to improve tolerance to scan the environment, sit, and complete ADLs. 3. ? Strength: Pt will be able to complete the deep neck flexor endurance test for greater than or equal to 25\" in order to demonstrate improved deep neck flexor strength to assist with head position. 4. ? Function: Pt will score less than or equal to 40% on the NDI in order to demonstrate improved function on ADLs. 5. Independent with Home Exercise Programs  LTG: (to be met in 12 treatments)  1. ? Pain: Pt will report less than or equal 2-3/10 neck pain with sitting, lifting, and completing work specific tasks in order to progress to prior level of function. 2. ? ROM: Pt will demonstrate cervical ROM to great than or equal to 70 degrees rotation and flexion/ext with 0/10 pain in order to improve ability to drive, perform ADLs, and progress to prior level of activity. 3. ? Strength: Pt will demonstrate 5/5 BUE and scapular strength (4/5 lower trap) and 30\" deep neck flexor endurance test in order to improve tolerance to lifting and completing ADLs/work tasks.    4. ? Function: Pt will score less than or equal to 30% on the NDI in order to

## 2020-11-16 ENCOUNTER — HOSPITAL ENCOUNTER (OUTPATIENT)
Dept: PHYSICAL THERAPY | Facility: CLINIC | Age: 40
Setting detail: THERAPIES SERIES
Discharge: HOME | End: 2020-11-16
Payer: COMMERCIAL

## 2020-11-16 PROCEDURE — 97110 THERAPEUTIC EXERCISES: CPT

## 2020-11-16 NOTE — FLOWSHEET NOTE
[] [address]  [address]  [phone]  F: [phone] [x] [address]  [facility] [address]   [address]  P: [phone]  F: [phone] [] [address]  [address]  P: [phone]  F: [phone] [] [address]  P: [phone]  F: [phone] [] [address]  [facility]   [address]   [place]: [phone]  F: [phone]      Physical Therapy Daily Treatment Note    Date:  2020  Patient Name:  Jaylen Sood    :  1980  MRN: [id number]  Physician: Ct Sanchez CNP     Insurance: 1. Auto insurance 2. Med [place]   Diagnosis:   accident), subsequent encounter    M54.5 (ICD-10-CM) - Acute bilateral low back pain without sciatica    Onset Date: 2020   Next Dr. Nikki Peck: tbd  Visit# / total visits: back:   Neck:   Cancels/No Shows: 0/0    Subjective:    Pain:  [] Yes  [x] No Location: low back Pain Rating: (0-10 scale) 0/10 neck 0/10 low back   Pain altered Tx:  [x] No  [] Yes  Action:    Comments: Pt states she has no pain today upon entry. She reports she had a lot of pain yesterday and put some patches on which helped.      Objective:   Modalities:   Exercises: Access Code: [email or web address]   Bold completed  Exercise Reps/ Time Weight/ Level Comments   Warm up- bike 5'  Added 10/26   Supine       LTR 5x ea       DKTC 3X20\" 350 [facility]  5x10\" holds strap     PPT 20x3\"  Added 10/19   hooklying glute sets 20x       TrA activation x   Compensates with posterior pelvic tilt Deficient activation, time spent educating pt on activation and benefits of completing multiple times per day  - educated to complete in sitting and standing along with hooklying   Heel Taps  15x  Added 10/21   Nyra Cart with PPT  10x2  Added 10/21   Hamstring stretch 20\"x3 belt Added 10/19   Marching with TA 20x  Added 10/19   SLR 20xea  Added 10/19  Increased reps 10/28     Dead  bug 20x  Added 10/21  Increased reps 10/26         Seated      Seated Piriformis 20\"x2 ea  Added 10/19   Seated ball roll fwd laterally  20\"x2 ea  Added 10/19         Prone      Quad stretch 30\"x3  Added 10/19   LE lifts 15x  Added 10/21         Sidelying       Clamshells  20x  Added 10/21 increased reps 10/28   Reverse clamshells  20x  Added 10/21 increased reps 10/28   Hip abd 20x  Added 10/21 increased reps 10/28         Quadriped      Leg ext with TA 10x ea Not today Max cues 10/21   juanjo pose 10\"x3 directions  Added 10/26   Cat cow 10x5\" ea  Added 10/26         Standing       Calf stretch 20\"x3  Added 10/26   Squat mini 10x2  Added 10/26 max cues with chair this date  Second set 10/28   Standing 3 way hip with TA cont 15x bilat Increased reps 11/2/20 bilaterally  HELD 11/16                Other:      Specific Instructions for next treatment: flexibility and core strengthening; walking tolerance     Todays Treatment:  Modalities:   Exercises: bold completed  Exercise Reps/ Time Weight/ Level Comments   manual 5'   SOR   Supine Cervical Retractions 10x5\"   Added 10/28    Seated scap retraction 10x5\"   Tactile cues   Upper trap stretch 2x20\"       Levator scap 1x20\"      Don't shoots 10x  Added 10/28   pec doorway stretch 20\"x3  Added 10/28         tband      rows 20x lime Added 10/28   Shoulder ext 20x  lime  Added 10/28         Education  x   Pt educated to monitor dizziness with stretching and to modify position and time of stretching if present. Pt advised to monitor persistence of dizziness.    Pt educated on pathology and symptomology    Other:     Specific Instructions for next treatment: manual to the cervical spine, upper trap, levator; postural muscle strengthening            Treatment Charges: Mins Units   []  Modalities     [x]  Ther Exercise 35 2   []  Manual Therapy     []  Ther Activities     []  Aquatics     []  Vasocompression     []  Other     Total Treatment time 35 2       Assessment: [] Progressing toward goals. [] No change. [x] Other: Pt had a fair tolerance to treatment today. She was able to complete all low back exercises with no issues today. Pt had some dizziness following neck stretches and wanted to be done with treatment. Pt laid down in the back in darkness for about 20 minutes until dizziness was gone. Discussed with her to possibly have her speak with our vestibular therapist to help with her symptoms. [x] Patient would continue to benefit from skilled physical therapy services in order to: Pt will benefit from skilled therapeutic interventions including therapeutic exercise and manual therapy in order to progress to prior level of activity    STG: (to be met in 6 treatments)  1. ? Pain: Pt will report less than or equal to 5-6/10 low back pain with laying down, sitting, standing, and walking for more than an hour in order to progress back to prior level of activity. 2. ? ROM: Pt will be able to complete lumbar AROM in all planes with less than or equal to 1-2/10 increase in low back pain in order to improve tolerance to rolling over in bed and completing ADLs. 3. ? Strength: Pt will be able to complete 10 TrA activations and sustain for 5\" in order to demonstrate improved muscle activation to assist with abdominal bracing when standing, walking, and lifting. 4. ? Function: Pt will score less than or equal to 32% on the Oswestry in order to demonstrate improvements in function. 5. Independent with Home Exercise Programs  LTG: (to be met in 12 treatments)  1. ? Pain: Pt will report less than or equal to 3-4/10 low back pain with laying down, sitting, standing, and walking for more than an hour in order to progress back to prior level of activity.    2. ? ROM: Pt will be able to complete lumbar AROM in all tasks.   4. ? Function: Pt will score less than or equal to 30% on the NDI in order to demonstrate improved function with ADLs.       Patient goals: improve neck pain    Pt. Education:  [] Yes  [x] No  [] Reviewed Prior HEP/Ed  Method of Education: [] Verbal  [] Demo  [] Written  Comprehension of Education:  [] Verbalizes understanding. [] Demonstrates understanding. [x] Needs review. [] Demonstrates/verbalizes HEP/Ed previously given. Plan: [x] Continue current frequency toward long and short term goals. [x] Specific Instructions for subsequent treatments: Pt will benefit from skilled therapeutic interventions including therapeutic exercise and manual therapy in order to progress to prior level of activity.        Time In: 5:00pm             Time Out: 600pm    Electronically signed by:  Nathan Collins PTA

## 2020-11-23 ENCOUNTER — HOSPITAL ENCOUNTER (OUTPATIENT)
Dept: PHYSICAL THERAPY | Facility: CLINIC | Age: 40
Setting detail: THERAPIES SERIES
Discharge: HOME | End: 2020-11-23
Payer: COMMERCIAL

## 2020-11-23 PROCEDURE — 97110 THERAPEUTIC EXERCISES: CPT

## 2020-11-23 NOTE — FLOWSHEET NOTE
[] [facility]  [address]  [phone]  F: [phone] [x] [address]  [place] [address]   [address]  P: [phone]  F: [phone] [] [place]  [address]  P: [phone]  F: [phone] [] [address]  P: [phone]  F: [phone] [] [address]  [facility]   [address]   [place]: [phone]  F: [phone]      Physical Therapy Daily Treatment Note    Date:  2020  Patient Name:  Lele Whitehead    :  1980  MRN: [id number]  Physician: Jenna Cao CNP     Insurance: 1. Auto insurance 2. Med [place]   Diagnosis:   accident), subsequent encounter    M54.5 (ICD-10-CM) - Acute bilateral low back pain without sciatica    Onset Date: 2020   Next Dr. Bertha Carter: tbd  Visit# / total visits: back:   Neck:   Cancels/No Shows: 0/0    Subjective:    Pain:  [] Yes  [x] No Location: low back Pain Rating: (0-10 scale) 4/10 neck 1/10 low back   Pain altered Tx:  [x] No  [] Yes  Action:    Comments: Pt notes she has pads on her neck and back today. Pt notes she is coming from work. Pt reports she is able to do everything she needs to do at work but sleeping is disturbed and sitting in her car is sometimes disturbed. Pt notes walking is okay but sitting seems to be the worst. Pt notes she has not gotten any dizziness since last week. Pt did note she did have some dizziness when at work one day and reports she did have to leave. Pt does not recall what she was doing that caused this dizziness.      Objective:    Modalities:   Exercises: Access Code: [email or web address]   Bold completed  Exercise Reps/ Time Weight/ Level Comments   Warm up- bike 5'  Added 10/26   Supine       LTR 5x ea       DKTC ROM: Pt will be able to complete lumbar AROM in all planes with less than or equal to 1-2/10 increase in low back pain in order to improve tolerance to rolling over in bed and completing ADLs. MET 11/23/2020 (all motions WFL, with mild soreness with SB)   3. ? Strength: Pt will be able to complete 10 TrA activations and sustain for 5\" in order to demonstrate improved muscle activation to assist with abdominal bracing when standing, walking, and lifting. MET 11/23/2020  4. ? Function: Pt will score less than or equal to 32% on the Oswestry in order to demonstrate improvements in function. MET 11/23/2020 (32% impairment)  5. Independent with Home Exercise Programs MET 11/23/2020  LTG: (to be met in 12 treatments)  1. ? Pain: Pt will report less than or equal to 3-4/10 low back pain with laying down, sitting, standing, and walking for more than an hour in order to progress back to prior level of activity. 2. ? ROM: Pt will be able to complete lumbar AROM in all planes with less than or equal to 0/10 increase in low back pain in order to improve tolerance to rolling over in bed and completing ADLs. 3. ? Strength: Pt will demonstrate 5/5 hip girdle strength and ability to carry 10# weight to simulate a laundry basket in order to progress tolerance to carrying and lifting tasks at home and work. 4. ? Function: Pt will score less than or equal to 22% on the Oswestry in order to demonstrate improvements in function.        Patient goals: \"not to last long and to get the pain down\"    STG: (to be met in 6 treatments)  1. ? Pain: Pt will report less than or equal to 5-6/10 neck pain with therapeutic exercise in order to improve tolerance to sitting and completing ADLs and allow ease when returning to work.  MET 11/23/2020 (5-6/10, notices at night)  2. ? ROM: Pt will be able to improve cervical neck flexion and extension with 1-2/10 increase in pain and R cervical rotation to 60 deg in order to improve tolerance to scan the environment, sit, and complete ADLs. MET 11/23/2020 (1/10 ext; sore flex; 75° left rot; >90° with R rot, mild trunk rotation)  3. ? Strength: Pt will be able to complete the deep neck flexor endurance test for greater than or equal to 25\" in order to demonstrate improved deep neck flexor strength to assist with head position. Ongoing 11/23/2020 (7\")   4. ? Function: Pt will score less than or equal to 40% on the NDI in order to demonstrate improved function on ADLs. MET 11/23/2020 (22% impairment)  5. Independent with Home Exercise Programs MET 11/23/2020  LTG: (to be met in 12 treatments)  1. ? Pain: Pt will report less than or equal 2-3/10 neck pain with sitting, lifting, and completing work specific tasks in order to progress to prior level of function. 2. ? ROM: Pt will demonstrate cervical ROM to great than or equal to 70 degrees rotation and flexion/ext with 0/10 pain in order to improve ability to drive, perform ADLs, and progress to prior level of activity. 3. ? Strength: Pt will demonstrate 5/5 BUE and scapular strength (4/5 lower trap) and 30\" deep neck flexor endurance test in order to improve tolerance to lifting and completing ADLs/work tasks. 4. ? Function: Pt will score less than or equal to 30% on the NDI in order to demonstrate improved function with ADLs.       Patient goals: improve neck pain    Pt. Education:  [x] Yes  [] No  [] Reviewed Prior HEP/Ed  Method of Education: [x] Verbal  [] Demo  [] Written  Comprehension of Education:  [x] Verbalizes understanding. [x] Demonstrates understanding. [x] Needs review. [] Demonstrates/verbalizes HEP/Ed previously given. Plan: [x] Continue current frequency toward long and short term goals. [x] Specific Instructions for subsequent treatments: Pt will benefit from skilled therapeutic interventions including therapeutic exercise and manual therapy in order to progress to prior level of activity.        Time In: 400 pm             Time Out: 455 pm    Electronically signed by:  Jason Cartagena PT

## 2020-11-30 ENCOUNTER — HOSPITAL ENCOUNTER (OUTPATIENT)
Dept: PHYSICAL THERAPY | Facility: CLINIC | Age: 40
Setting detail: THERAPIES SERIES
Discharge: HOME | End: 2020-11-30
Payer: COMMERCIAL

## 2020-11-30 NOTE — FLOWSHEET NOTE
[] [facility]) - [facility] &  Therapy  [address]    [phone]  F: [phone]   [x] [address] [employer] [facility] [address]   [address]  P: [phone]  F: [phone]  [] [address] [facility]  Therapy  [address] [facility]  P: [phone]  F: [phone] [] [address]  P: [phone]  F: [phone]  [] [address]  [facility]   [address]   [place]: [phone]  F: [phone]   [] [facility]  [address] [facility] [address]  [place]: [phone]   F: [phone]     Physical Therapy Cancel/No Show note    Date: 2020  Patient: Tere Feldman  : 1980  MRN: [id number]    Cancels/No Shows to date:     For today's appointment patient:    [x]  Cancelled    [] Rescheduled appointment    [] No-show     Reason given by patient:    []  Patient ill    [x]  Conflicting appointment    [] No transportation      [] Conflict with work    [] No reason given    [] Weather related    [] COVID-19    [] Other:      Comments:  Pt left message mentioning conflict with another appt this date.       [x] Next appointment was confirmed    Electronically signed by: Yudi Trinidad PTA

## 2020-12-07 ENCOUNTER — HOSPITAL ENCOUNTER (OUTPATIENT)
Dept: PHYSICAL THERAPY | Facility: CLINIC | Age: 40
Setting detail: THERAPIES SERIES
Discharge: HOME | End: 2020-12-07
Payer: COMMERCIAL

## 2020-12-07 PROCEDURE — 97110 THERAPEUTIC EXERCISES: CPT

## 2020-12-07 NOTE — FLOWSHEET NOTE
exercises. Lumbar parasinal and hamstring stretching was completed to assist with low back soreness. Pt is able to complete progress core and postural muscle strengthening activities this date without complaints of pain. pt continues to present with decreased hip girdle strength with cueing to increase range of motion during sidelying clamshells and rest breaks are required for muscle recovery. Pt did require a pillow under her hips during hip extension to allow for greater ROM secondary to glute weakness. Pt continues to require intermittent cueing throughout the session for proper execution of exercises. Pt also requires cueing to increase knee flexion during squatting to improve quad loading. Pt required frequent verbal and intermittent tactile cues with completion of paloff presses and side steps as pt demonstrated difficulty controlling the rotational movement. A decrease in resistance was provided for successful completion of side stepping without trunk rotation. [] Other    [x] Patient would continue to benefit from skilled physical therapy services in order to: Pt will benefit from skilled therapeutic interventions including therapeutic exercise and manual therapy in order to progress to prior level of activity    STG: (to be met in 6 treatments)  1. ? Pain: Pt will report less than or equal to 5-6/10 low back pain with laying down, sitting, standing, and walking for more than an hour in order to progress back to prior level of activity. MET 11/23/2020 (6/10- uncomfortable)  2. ? ROM: Pt will be able to complete lumbar AROM in all planes with less than or equal to 1-2/10 increase in low back pain in order to improve tolerance to rolling over in bed and completing ADLs.  MET 11/23/2020 (all motions WFL, with mild soreness with SB)   3. ? Strength: Pt will be able to complete 10 TrA activations and sustain for 5\" in order to demonstrate improved muscle activation to assist with abdominal bracing when standing, walking, and lifting. MET 11/23/2020  4. ? Function: Pt will score less than or equal to 32% on the Oswestry in order to demonstrate improvements in function. MET 11/23/2020 (32% impairment)  5. Independent with Home Exercise Programs MET 11/23/2020  LTG: (to be met in 12 treatments)  1. ? Pain: Pt will report less than or equal to 3-4/10 low back pain with laying down, sitting, standing, and walking for more than an hour in order to progress back to prior level of activity. 2. ? ROM: Pt will be able to complete lumbar AROM in all planes with less than or equal to 0/10 increase in low back pain in order to improve tolerance to rolling over in bed and completing ADLs. 3. ? Strength: Pt will demonstrate 5/5 hip girdle strength and ability to carry 10# weight to simulate a laundry basket in order to progress tolerance to carrying and lifting tasks at home and work. 4. ? Function: Pt will score less than or equal to 22% on the Oswestry in order to demonstrate improvements in function.        Patient goals: \"not to last long and to get the pain down\"    STG: (to be met in 6 treatments)  1. ? Pain: Pt will report less than or equal to 5-6/10 neck pain with therapeutic exercise in order to improve tolerance to sitting and completing ADLs and allow ease when returning to work. MET 11/23/2020 (5-6/10, notices at night)  2. ? ROM: Pt will be able to improve cervical neck flexion and extension with 1-2/10 increase in pain and R cervical rotation to 60 deg in order to improve tolerance to scan the environment, sit, and complete ADLs. MET 11/23/2020 (1/10 ext; sore flex; 75° left rot; >90° with R rot, mild trunk rotation)  3. ? Strength: Pt will be able to complete the deep neck flexor endurance test for greater than or equal to 25\" in order to demonstrate improved deep neck flexor strength to assist with head position.  Ongoing 11/23/2020 (7\")   4. ? Function: Pt will score less than or equal to 40% on the NDI in order to demonstrate improved function on ADLs. MET 11/23/2020 (22% impairment)  5. Independent with Home Exercise Programs MET 11/23/2020  LTG: (to be met in 12 treatments)  1. ? Pain: Pt will report less than or equal 2-3/10 neck pain with sitting, lifting, and completing work specific tasks in order to progress to prior level of function. 2. ? ROM: Pt will demonstrate cervical ROM to great than or equal to 70 degrees rotation and flexion/ext with 0/10 pain in order to improve ability to drive, perform ADLs, and progress to prior level of activity. 3. ? Strength: Pt will demonstrate 5/5 BUE and scapular strength (4/5 lower trap) and 30\" deep neck flexor endurance test in order to improve tolerance to lifting and completing ADLs/work tasks. 4. ? Function: Pt will score less than or equal to 30% on the NDI in order to demonstrate improved function with ADLs.       Patient goals: improve neck pain    Pt. Education:  [x] Yes  [] No  [] Reviewed Prior HEP/Ed  Method of Education: [x] Verbal  [] Demo  [] Written  Comprehension of Education:  [x] Verbalizes understanding. [x] Demonstrates understanding. [x] Needs review. [] Demonstrates/verbalizes HEP/Ed previously given. Plan: [x] Continue current frequency toward long and short term goals. [x] Specific Instructions for subsequent treatments: Pt will benefit from skilled therapeutic interventions including therapeutic exercise and manual therapy in order to progress to prior level of activity.        Time In: 405 pm             Time Out: 456 pm    Electronically signed by:  Bettye Angelo PT

## 2020-12-14 ENCOUNTER — HOSPITAL ENCOUNTER (OUTPATIENT)
Dept: PHYSICAL THERAPY | Facility: CLINIC | Age: 40
Setting detail: THERAPIES SERIES
Discharge: HOME | End: 2020-12-14
Payer: COMMERCIAL

## 2020-12-14 PROCEDURE — 97110 THERAPEUTIC EXERCISES: CPT

## 2020-12-14 NOTE — FLOWSHEET NOTE
[] [facility] [address]  [address]  [phone]  F: [phone] [x] [address]  [place] [address]   [address]  P: [phone]  F: [phone] [] [place]  [address]  P: [phone]  F: [phone] [] [address]  P: [phone]  F: [phone] [] [address]  [facility]   [address]   [place]: [phone]  F: [phone]      Physical Therapy Daily Treatment Note    Date:  2020  Patient Name:  Ross Lacey    :  1980  MRN: [id number]  Physician: Willy Herzog CNP     Insurance: 1. Auto insurance 2. Med [place]   Diagnosis:   accident), subsequent encounter    M54.5 (ICD-10-CM) - Acute bilateral low back pain without sciatica    Onset Date: 2020   Next Dr. Chantelle Bernstein: tbd  Visit# / total visits: back:   Neck:   Cancels/No Shows: 0/0    Subjective:    Pain:  [] Yes  [x] No Location: low back Pain Rating: (0-10 scale) 0/10 neck 0/10 low back   Pain altered Tx:  [x] No  [] Yes  Action:    Comments: Pt denies pain this date.      Objective:    Modalities:   Exercises: Access Code: [email or web address]   Bold completed  Exercise Reps/ Time Weight/ Level Comments   Warm up- True bike 6'  Added 10/26   Supine       LTR 10x ea       DKTC 3x30\" STRAP     Hamstring stretch 2x30\" belt    Supine clamshells 2x15 blue Progressed    Bridges with PPT  15x2  Added 10/21   SLR 20xea  Added 10/19  Increased reps 10/28     Dead  Bug (opp arm/opp leg) 20x ea  Added 10/21  Increased reps 10/26               Prone      LE lifts 10x ea  Pillow under hips  Dec reps due to dec height          Sidelying       Clamshells  20x     Reverse clamshells  20x  Added 10/21 increased reps 10/28   Hip abd 2x10  Added 10/21 wall push-ups despite cueing for correction. Addition of side steps, monster walks, farmers carry, and steps were completed without complaints of back pain. Pt does require cueing to maintain level shoulders during farmers carry for core strengthening and to prevent trunk lean. Re-assessment of most goals this date demonstrate improvements in ROM and hip girdle strength. Pt also denies an increase in low back pain with carrying 10 lbs to assist with tolerance to carrying a laundry basket. Pt will have 1 treatment remaining for her low back to review exercises and will be discharged after next session. [] Other    [x] Patient would continue to benefit from skilled physical therapy services in order to: Pt will benefit from skilled therapeutic interventions including therapeutic exercise and manual therapy in order to progress to prior level of activity    *= pain STRENGTH   ROM     Left Right Lumbar      L1-2 Hip Flex (supine)  4+  4+ Flexion To toes   Hip Abd 4 4 Extension Greater ext noted in the cervical spine   Hip IR 5 5 Rotation (mild pain) L WFL R WFL   Hip ER 5 5 Sidebend L WFL R  WFL   Hip ext 4+ 4+       HS (seated) 5 5               STG: (to be met in 6 treatments)  1. ? Pain: Pt will report less than or equal to 5-6/10 low back pain with laying down, sitting, standing, and walking for more than an hour in order to progress back to prior level of activity. MET 11/23/2020 (6/10- uncomfortable)  2. ? ROM: Pt will be able to complete lumbar AROM in all planes with less than or equal to 1-2/10 increase in low back pain in order to improve tolerance to rolling over in bed and completing ADLs. MET 11/23/2020 (all motions WFL, with mild soreness with SB)   3. ? Strength: Pt will be able to complete 10 TrA activations and sustain for 5\" in order to demonstrate improved muscle activation to assist with abdominal bracing when standing, walking, and lifting.  MET 11/23/2020  4. ? Function: Pt will score less than or equal to 32% on the Oswestry in order to demonstrate improvements in function. MET 11/23/2020 (32% impairment)  5. Independent with Home Exercise Programs MET 11/23/2020  LTG: (to be met in 12 treatments)  1. ? Pain: Pt will report less than or equal to 3-4/10 low back pain with laying down, sitting, standing, and walking for more than an hour in order to progress back to prior level of activity. MET 12/14/2020  2. ? ROM: Pt will be able to complete lumbar AROM in all planes with less than or equal to 0/10 increase in low back pain in order to improve tolerance to rolling over in bed and completing ADLs. MET 12/14/2020  3. ? Strength: Pt will demonstrate 5/5 hip girdle strength and ability to carry 10# weight to simulate a laundry basket in order to progress tolerance to carrying and lifting tasks at home and work. Partially MET 12/14  4. ? Function: Pt will score less than or equal to 22% on the Oswestry in order to demonstrate improvements in function.        Patient goals: \"not to last long and to get the pain down\"    STG: (to be met in 6 treatments)  1. ? Pain: Pt will report less than or equal to 5-6/10 neck pain with therapeutic exercise in order to improve tolerance to sitting and completing ADLs and allow ease when returning to work. MET 11/23/2020 (5-6/10, notices at night)  2. ? ROM: Pt will be able to improve cervical neck flexion and extension with 1-2/10 increase in pain and R cervical rotation to 60 deg in order to improve tolerance to scan the environment, sit, and complete ADLs. MET 11/23/2020 (1/10 ext; sore flex; 75° left rot; >90° with R rot, mild trunk rotation)  3. ? Strength: Pt will be able to complete the deep neck flexor endurance test for greater than or equal to 25\" in order to demonstrate improved deep neck flexor strength to assist with head position.  Ongoing 11/23/2020 (7\")   4. ? Function: Pt will score less than or equal to 40% on the NDI in order to demonstrate improved function on ADLs. MET 11/23/2020 (22% impairment)  5. Independent with Home Exercise Programs MET 11/23/2020  LTG: (to be met in 12 treatments)  1. ? Pain: Pt will report less than or equal 2-3/10 neck pain with sitting, lifting, and completing work specific tasks in order to progress to prior level of function. 2. ? ROM: Pt will demonstrate cervical ROM to great than or equal to 70 degrees rotation and flexion/ext with 0/10 pain in order to improve ability to drive, perform ADLs, and progress to prior level of activity. 3. ? Strength: Pt will demonstrate 5/5 BUE and scapular strength (4/5 lower trap) and 30\" deep neck flexor endurance test in order to improve tolerance to lifting and completing ADLs/work tasks. 4. ? Function: Pt will score less than or equal to 30% on the NDI in order to demonstrate improved function with ADLs.       Patient goals: improve neck pain    Pt. Education:  [x] Yes  [] No  [] Reviewed Prior HEP/Ed  Method of Education: [x] Verbal  [] Demo  [] Written  Comprehension of Education:  [x] Verbalizes understanding. [x] Demonstrates understanding. [x] Needs review. [] Demonstrates/verbalizes HEP/Ed previously given. Plan: [x] Continue current frequency toward long and short term goals. [x] Specific Instructions for subsequent treatments: Pt will benefit from skilled therapeutic interventions including therapeutic exercise and manual therapy in order to progress to prior level of activity.        Time In: 400 pm             Time Out: 449 pm    Electronically signed by:  Brenda Blunt PT

## 2020-12-21 ENCOUNTER — HOSPITAL ENCOUNTER (OUTPATIENT)
Dept: PHYSICAL THERAPY | Facility: CLINIC | Age: 40
Setting detail: THERAPIES SERIES
Discharge: HOME | End: 2020-12-21
Payer: COMMERCIAL

## 2020-12-21 PROCEDURE — 97110 THERAPEUTIC EXERCISES: CPT

## 2020-12-21 NOTE — FLOWSHEET NOTE
[] [facility] [facility]  [address]  [phone]  F: [phone] [x] [address]  [place]   [address]  P: [phone]  F: [phone] [] [place]  [address]  P: [phone]  F: [phone] [] [address]  P: [phone]  F: [phone] [] [address]  [facility]   [address]   [place]: [phone]  F: [phone]      Physical Therapy Daily Treatment Note    Date:  2020  Patient Name:  Sharri Chin    :  1980  MRN: [id number]  Physician: Arias Pinto CNP     Insurance: 1. Auto insurance 2. Med [place]   Diagnosis:   accident), subsequent encounter    M54.5 (ICD-10-CM) - Acute bilateral low back pain without sciatica    Onset Date: 2020   Next Dr. Jason Turciosivener: tbd  Visit# / total visits: back:   Neck:   Cancels/No Shows: 0/0    Subjective:    Pain:  [] Yes  [x] No Location: low back Pain Rating: (0-10 scale) 0/10 neck 0/10 low back   Pain altered Tx:  [x] No  [] Yes  Action:    Comments: Pt denies pain this date in her neck and 1/10 in LB. States she wants today to be last day for her neck as well. Pt reports her neck doesn't bother her unless she is reading or laying in bed.      Objective:    Modalities:   Exercises: Access Code: [email or web address]   Bold completed  Exercise Reps/ Time Weight/ Level Comments   Warm up- True bike 6'  Added 10/26         Supine       LTR 10x ea       DKTC 3x30\" STRAP     Hamstring stretch 2x30\" belt    Supine clamshells 2x15 blue Progressed    Bridges with PPT  15x2  Added 10/21   SLR 20xea  Added 10/19  Increased reps 10/28     Dead  Bug (opp arm/opp leg) 20x ea  Added 10/21  Increased reps 10/26               Prone      LE lifts 10x ea  Pillow time to home program. Pt was provided with copies of previously administered HEP and a newly updated program. Pt denies questions as this time. [] Other    [x] Patient would continue to benefit from skilled physical therapy services in order to: Pt will benefit from skilled therapeutic interventions including therapeutic exercise and manual therapy in order to progress to prior level of activity    *= pain STRENGTH   ROM     Left Right Lumbar      L1-2 Hip Flex (supine)  4+  4+ Flexion To toes   Hip Abd 4 4 Extension Greater ext noted in the cervical spine   Hip IR 5 5 Rotation (mild pain) L WFL R WFL   Hip ER 5 5 Sidebend L WFL R  WFL   Hip ext 4+ 4+       HS (seated) 5 5                 Shoulder   Cervical     Flexion  5 5 Rotation (mild soreness) 65 64   Abduction  5 5 Flexion  WFL    Lats  5 5 Extension  WFL    Rhomboids 5 5 Deep neck flexor endurance test 30\"     Middle trap 5 5      Lower trap 5 4+                                            STG: (to be met in 6 treatments)  1. ? Pain: Pt will report less than or equal to 5-6/10 low back pain with laying down, sitting, standing, and walking for more than an hour in order to progress back to prior level of activity. MET 11/23/2020 (6/10- uncomfortable)  2. ? ROM: Pt will be able to complete lumbar AROM in all planes with less than or equal to 1-2/10 increase in low back pain in order to improve tolerance to rolling over in bed and completing ADLs. MET 11/23/2020 (all motions WFL, with mild soreness with SB)   3. ? Strength: Pt will be able to complete 10 TrA activations and sustain for 5\" in order to demonstrate improved muscle activation to assist with abdominal bracing when standing, walking, and lifting. MET 11/23/2020  4. ? Function: Pt will score less than or equal to 32% on the Oswestry in order to demonstrate improvements in function. MET 11/23/2020 (32% impairment)  5.  Independent with Home Exercise Programs MET 11/23/2020  LTG: (to be met in 12 neck pain with sitting, lifting, and completing work specific tasks in order to progress to prior level of function. MET 12/21  3. ? ROM: Pt will demonstrate cervical ROM to great than or equal to 70 degrees rotation and flexion/ext with 0/10 pain in order to improve ability to drive, perform ADLs, and progress to prior level of activity. Partially MET 12/21  4. ? Strength: Pt will demonstrate 5/5 BUE and scapular strength (4/5 lower trap) and 30\" deep neck flexor endurance test in order to improve tolerance to lifting and completing ADLs/work tasks. MET 12/21  5. ? Function: Pt will score less than or equal to 30% on the NDI in order to demonstrate improved function with ADLs. MET 20% 12/21        Patient goals: improve neck pain    Pt. Education:  [x] Yes  [] No  [] Reviewed Prior HEP/Ed  Method of Education: [x] Verbal  [] Demo  [x] Written  12/21 - monster walk/side step, mini squats, tband rows and ext, Step ups wall push ups, SA slides and a copy of previously completed HEP. Comprehension of Education:  [x] Verbalizes understanding. [x] Demonstrates understanding. [x] Needs review. [] Demonstrates/verbalizes HEP/Ed previously given. Plan: [] Continue current frequency toward long and short term goals.     [x] Discharge      Time In: 430 pm             Time Out: 525 pm    Electronically signed by:  Leslee Blanchard PTA

## 2020-12-22 NOTE — DISCHARGE SUMMARY
[] [facility]) - [facility]  Therapy  [address]  [phone]  F: [phone] [x] [address]  [place] [address]   [address]  P: [phone]  F: [phone] [] [place]  [address]  P: [phone]  F: [phone] [] [address]  P: [phone]  F: [phone] [] [address]  [facility]   [address]   [place]: [phone]  F: [phone]      Physical Therapy Discharge Note    Date: 2020      Patient: Agnieszka Jackson  : 1980  MRN: [id number]: Carlos Castaneda CNP                                     Insurance: 1. Auto insurance 2. [employer]   Diagnosis:   accident), subsequent encounter    M54.5 (ICD-10-CM) - Acute bilateral low back pain without sciatica    Onset Date: 2020                                  Next Dr. Taiwo Benjamin: tbd  Visit# / total visits: back:   Neck:              Cancels/No Shows: 0/0  Date of initial visit: 10/13/2020                Date of final visit: 20       Subjective:    Pain:  []? Yes  [x]? No   Location: low back      Pain Rating: (0-10 scale) 0/10 neck 0/10 low back   Pain altered Tx:  [x]? No  []? Yes  Action:     Comments: Pt denies pain this date in her neck and 1/10 in LB. States she wants today to be last day for her neck as well. Pt reports her neck doesn't bother her unless she is reading or laying in bed.     Objective:  *= pain STRENGTH   ROM     Left Right Lumbar      L1-2 Hip Flex (supine)  4+  4+ Flexion To toes   Hip Abd 4 4 Extension Greater ext noted in the cervical spine   Hip IR 5 5 Rotation (mild pain) L WFL R WFL   Hip ER 5 5 Sidebend L WFL R  WFL   Hip ext 4+ 4+       HS (seated) 5 5                       Shoulder     Cervical     Flexion  5 5 Rotation (mild soreness) 65 64   Abduction  5 5 Flexion  WFL     Lats  5 5 Extension  WFL     Rhomboids 5 5 Deep neck flexor endurance test 30\"      Middle trap 5 5         Lower trap 5 4+             Assessment:  [x]? Progressing toward goals. Pt with overall good tolerance to treatment and denies an increase in symptoms. Intermittent cueing to complete with improved technique. Time spent assessing goals for cervical spine this date. Pt will be discharged at this time to home program. Pt was provided with copies of previously administered HEP and a newly updated program. Pt denies questions as this time. []? Other                          [x]? Patient would continue to benefit from skilled physical therapy services in order to: Pt will benefit from skilled therapeutic interventions including therapeutic exercise and manual therapy in order to progress to prior level of activity    STG: (to be met in 6 treatments)  1. ? Pain: Pt will report less than or equal to 5-6/10 low back pain with laying down, sitting, standing, and walking for more than an hour in order to progress back to prior level of activity.  MET 11/23/2020 (6/10- uncomfortable)  2. ? ROM: Pt will be able to complete lumbar AROM in all planes with less than or equal to 1-2/10 increase in low back pain in order to improve tolerance to rolling over in bed and completing ADLs. MET 11/23/2020 (all motions WFL, with mild soreness with SB)   3. ? Strength: Pt will be able to complete 10 TrA activations and sustain for 5\" in order to demonstrate improved muscle activation to assist with abdominal bracing when standing, walking, and lifting. MET 11/23/2020  4. ? Function: Pt will score less than or equal to 32% on the Oswestry in order to demonstrate improvements in function. MET 11/23/2020 (32% impairment)  5.  Independent with Home Exercise Programs MET 11/23/2020  LTG: (to be met in 12 treatments)  1. ? Pain: Pt will report less than or equal to 3-4/10 low back pain with laying down, sitting, standing, and walking for more than an hour in order to progress back to prior level of activity.  MET 12/14/2020  2. ? ROM: Pt will be able to complete lumbar AROM in all planes with less than or equal to 0/10 increase in low back pain in order to improve tolerance to rolling over in bed and completing ADLs.  MET 12/14/2020  3. ? Strength: Pt will demonstrate 5/5 hip girdle strength and ability to carry 10# weight to simulate a laundry basket in order to progress tolerance to carrying and lifting tasks at home and work. Partially MET 12/14  4. ? Function: Pt will score less than or equal to 22% on the Oswestry in order to demonstrate improvements in function. NOT MET 24 % 12/21    STG: (to be met in 6 treatments)  1. ? Pain: Pt will report less than or equal to 5-6/10 neck pain with therapeutic exercise in order to improve tolerance to sitting and completing ADLs and allow ease when returning to work.  MET 11/23/2020 (5-6/10, notices at night)  2. ? ROM: Pt will be able to improve cervical neck flexion and extension with 1-2/10 increase in pain and R cervical rotation to 60 deg in order to improve tolerance to scan the environment, sit, and complete ADLs.  MET 11/23/2020 (1/10 ext; sore flex; 75° left rot; >90° with R rot, mild trunk rotation)  1. ? Strength: Pt will be able to complete the deep neck flexor endurance test for greater than or equal to 25\" in order to demonstrate improved deep neck flexor strength to assist with head position.  MET 12/21  3. ? Function: Pt will score less than or equal to 40% on the NDI in order to demonstrate improved function on ADLs. MET 11/23/2020 (22% impairment)  4.  Independent with Home Exercise Programs MET 11/23/2020  LTG: (to be met in 12 treatments)  2. ? Pain: Pt will report less than or equal 2-3/10 neck pain with sitting, lifting, and completing work specific tasks in order to progress to prior level of function.  MET 12/21  3. ? ROM: Pt will demonstrate cervical ROM to great than or equal to 70 degrees rotation and flexion/ext with 0/10 pain in order to improve ability to drive, perform ADLs, and progress to prior level of activity. Partially MET 12/21  4. ? Strength: Pt will demonstrate 5/5 BUE and scapular strength (4/5 lower trap) and 30\" deep neck flexor endurance test in order to improve tolerance to lifting and completing ADLs/work tasks.  MET 12/21  5. ? Function: Pt will score less than or equal to 30% on the NDI in order to demonstrate improved function with ADLs. MET 20% 12/21        Patient goals: improve neck pain    Treatment to Date:  [x] Therapeutic Exercise    [] Modalities:  [] Therapeutic Activity    [] Ultrasound  [] Electrical Stimulation  [] Gait Training     [] Massage       [] Lumbar/Cervical Traction  [] Neuromuscular Re-education [] Cold/hotpack [] Iontophoresis: 4 mg/mL  [x] Instruction in Home Exercise Program                     Dexamethasone Sodium  [x] Manual Therapy             Phosphate 40-80 mAmin  [] Aquatic Therapy                   [] Vasocompression/    [] Other:             Game Ready    Discharge Status:     [x] Pt recovered from conditions. Treatment goals were met. [] Pt received maximum benefit. No further therapy indicated at this time. [x] Pt to continue exercise/home instructions independently. [] Therapy interrupted due to:    [] Pt has 2 or more no shows/cancels, is discontinued per our policy. [] Pt has completed prescribed number of treatment sessions. [] Other:         Electronically signed by Katie Del Castillo PT on 12/21/2020 at 7:03 PM      If you have any questions or concerns, please don't hesitate to call.   Thank you for your referral.

## 2021-06-18 RX ORDER — NORETHINDRONE ACETATE AND ETHINYL ESTRADIOL 1MG-20(21)
KIT ORAL
Qty: 84 TABLET | Refills: 1 | Status: SHIPPED | OUTPATIENT
Start: 2021-06-18 | End: 2021-10-12 | Stop reason: SDUPTHER

## 2021-06-18 NOTE — TELEPHONE ENCOUNTER
Pt needs a month refill of BC. Pt needs it sent to the [facility] [address] Pharmacy in [facility]. Pt is scheduled for her annual on 07/09/21.  Thank you

## 2021-10-12 ENCOUNTER — TELEPHONE (OUTPATIENT)
Dept: OBGYN CLINIC | Age: 41
End: 2021-10-12

## 2021-10-12 RX ORDER — NORETHINDRONE ACETATE AND ETHINYL ESTRADIOL 1MG-20(21)
KIT ORAL
Qty: 84 TABLET | Refills: 0 | Status: SHIPPED | OUTPATIENT
Start: 2021-10-12 | End: 2021-12-16 | Stop reason: SDUPTHER

## 2021-12-16 ENCOUNTER — HOSPITAL ENCOUNTER (OUTPATIENT)
Age: 41
Setting detail: SPECIMEN
Discharge: HOME | End: 2021-12-16

## 2021-12-16 ENCOUNTER — OFFICE VISIT (OUTPATIENT)
Dept: OBGYN CLINIC | Age: 41
End: 2021-12-16
Payer: COMMERCIAL

## 2021-12-16 VITALS
SYSTOLIC BLOOD PRESSURE: 123 MMHG | WEIGHT: 201.4 LBS | DIASTOLIC BLOOD PRESSURE: 80 MMHG | HEIGHT: 66 IN | BODY MASS INDEX: 32.37 KG/M2 | HEART RATE: 69 BPM

## 2021-12-16 DIAGNOSIS — N64.4 BREAST PAIN, RIGHT: ICD-10-CM

## 2021-12-16 DIAGNOSIS — Z01.419 WOMEN'S ANNUAL ROUTINE GYNECOLOGICAL EXAMINATION: Primary | ICD-10-CM

## 2021-12-16 DIAGNOSIS — Z12.31 ENCOUNTER FOR SCREENING MAMMOGRAM FOR MALIGNANT NEOPLASM OF BREAST: ICD-10-CM

## 2021-12-16 DIAGNOSIS — Z00.00 ENCOUNTER FOR SCREENING AND PREVENTATIVE CARE: ICD-10-CM

## 2021-12-16 DIAGNOSIS — Z30.41 ENCOUNTER FOR SURVEILLANCE OF CONTRACEPTIVE PILLS: ICD-10-CM

## 2021-12-16 DIAGNOSIS — N94.6 DYSMENORRHEA: ICD-10-CM

## 2021-12-16 DIAGNOSIS — N39.3 STRESS INCONTINENCE: ICD-10-CM

## 2021-12-16 DIAGNOSIS — N89.8 VAGINAL ODOR: ICD-10-CM

## 2021-12-16 PROCEDURE — 99395 PREV VISIT EST AGE 18-39: CPT | Performed by: ADVANCED PRACTICE MIDWIFE

## 2021-12-16 RX ORDER — NORETHINDRONE ACETATE AND ETHINYL ESTRADIOL 1MG-20(21)
KIT ORAL
Qty: 84 TABLET | Refills: 5 | Status: SHIPPED | OUTPATIENT
Start: 2021-12-16 | End: 2022-05-19

## 2021-12-16 SDOH — ECONOMIC STABILITY: FOOD INSECURITY: WITHIN THE PAST 12 MONTHS, THE FOOD YOU BOUGHT JUST DIDN'T LAST AND YOU DIDN'T HAVE MONEY TO GET MORE.: SOMETIMES TRUE

## 2021-12-16 SDOH — ECONOMIC STABILITY: TRANSPORTATION INSECURITY
IN THE PAST 12 MONTHS, HAS THE LACK OF TRANSPORTATION KEPT YOU FROM MEDICAL APPOINTMENTS OR FROM GETTING MEDICATIONS?: YES

## 2021-12-16 SDOH — ECONOMIC STABILITY: FOOD INSECURITY: WITHIN THE PAST 12 MONTHS, YOU WORRIED THAT YOUR FOOD WOULD RUN OUT BEFORE YOU GOT MONEY TO BUY MORE.: SOMETIMES TRUE

## 2021-12-16 SDOH — ECONOMIC STABILITY: TRANSPORTATION INSECURITY
IN THE PAST 12 MONTHS, HAS LACK OF TRANSPORTATION KEPT YOU FROM MEETINGS, WORK, OR FROM GETTING THINGS NEEDED FOR DAILY LIVING?: YES

## 2021-12-16 ASSESSMENT — SOCIAL DETERMINANTS OF HEALTH (SDOH)
WITHIN THE LAST YEAR, HAVE TO BEEN RAPED OR FORCED TO HAVE ANY KIND OF SEXUAL ACTIVITY BY YOUR PARTNER OR EX-PARTNER?: NO
WITHIN THE LAST YEAR, HAVE YOU BEEN AFRAID OF YOUR PARTNER OR EX-PARTNER?: NO
HOW HARD IS IT FOR YOU TO PAY FOR THE VERY BASICS LIKE FOOD, HOUSING, MEDICAL CARE, AND HEATING?: NOT HARD AT ALL
WITHIN THE LAST YEAR, HAVE YOU BEEN KICKED, HIT, SLAPPED, OR OTHERWISE PHYSICALLY HURT BY YOUR PARTNER OR EX-PARTNER?: NO
WITHIN THE LAST YEAR, HAVE YOU BEEN HUMILIATED OR EMOTIONALLY ABUSED IN OTHER WAYS BY YOUR PARTNER OR EX-PARTNER?: NO

## 2021-12-16 NOTE — PROGRESS NOTES
Date of Visit:  2021  Patient :  1980     Subjective:     CHIEF COMPLAINT:     Chief Complaint   Patient presents with    Gynecologic Exam     last pap 3/13/18 wnl hpv neg       HPI    Her bowel habits are regular. She denies any bloating. She denies dysuria. She occasional urinary leaking, stress incontinence. She denies vaginal discharge, reports occasional odor, no irritation. She is sexually active with single partner, contraception - OCP (estrogen/progesterone). Declines STD screening   On COCPs  Is having 28 day cycle 3-4 day moderate flow  LMP believes 2021    Working at [employer] and is happy! Depression  2 question Screen:  Over the past two weeks, has the patient felt down,depressed or hopeless? No  Over the past two weeks, has the patient felt little interest or pleasure in doing things? No    PREVENTIVE HEALTH SCREENING:   Date of last pap:                  HPV typing/date :  negative  Abnormal pap smear history: yes    Date of last mammogram: n/a   Date of last DEXA scan: n/a  Date of last colonoscopy: n/a    History of Gestational Diabetes: No    Preventive screening: No    Family history of Breast, Ovarian, Colon or Uterine Cancer:   Maternal grandmother >50     If Yes see scanned worksheet    Objective:   GYNECOLOGIC HISTORY:   Menarche: 17    LMP: 2021  See above    Menopause: n/a  Hormone Replacement: no    Sexually active: Yes  HPV vaccine: possibly pt unsure     STD history: Yes, gonorrhea when 20    Birth control method :Yes  Permanent Sterilization: No    SOCIALHISTORY:  Seat Belt Use: Yes  Domestic Violence: No    Counseling: No  Regular exercise: No   Counseling:Yes     Diet discussed: Yes    Social History     Tobacco Use   Smoking Status Never Smoker   Smokeless Tobacco Never Used     Social History     Substance and Sexual Activity   Alcohol Use Yes    Comment: Social     Social History     Substance and Sexual Activity   Drug Use No       Current Outpatient Medications   Medication Sig Dispense Refill    norethindrone-ethinyl estradiol (BLISOVI FE 1/20) 1-20 MG-MCG per tablet TAKE 1 TABLET DAILY 84 tablet 5    ibuprofen (ADVIL;MOTRIN) 600 MG tablet Take 600 mg by mouth every 6 hours as needed for Pain      buPROPion (WELLBUTRIN XL) 150 MG extended release tablet       Homeopathic Products (AZO YEAST PLUS PO) Take by mouth      acetaminophen (TYLENOL 8 HOUR ARTHRITIS PAIN) 650 MG extended release tablet Take 650 mg by mouth every 8 hours as needed for Pain      Multiple Vitamins-Minerals (MULTIVITAMIN ADULT PO) Take by mouth       No current facility-administered medications for this visit. REVIEW OF SYSTEMS:  Review of Systems   Constitutional: Negative. HENT: Negative. Eyes: Negative. Respiratory: Negative. Cardiovascular: Negative. Gastrointestinal: Negative. Endocrine: Negative. Genitourinary:        Vaginal odor, stress incontinence    Musculoskeletal: Negative. Skin: Negative. Allergic/Immunologic: Negative. Neurological: Negative. Hematological: Negative. Psychiatric/Behavioral: Negative. Physical Exam:     Constitutional:   Blood pressure 123/80, pulse 69, height 5' 6\" (1.676 m), weight 201 lb 6.4 oz (91.4 kg), not currently breastfeeding. Wt Readings from Last 3 Encounters:   12/16/21 201 lb 6.4 oz (91.4 kg)   10/19/20 206 lb (93.4 kg)   10/07/20 202 lb (91.6 kg)       General Appearance: This is a well-developed, well-nourished and well-groomed female    Skin:  Inspection of the skin revealed no rashes or lesions    Neck and EENT:  No eye discharge and sclera non-icteric  Lips, teeth and gums without lesions and normal dentition  Nares are patent without discharge  Normal external ears are present with no hearing loss  The neck was supple with full range of motion and no masses. The thyroid was not enlarged and had no masses. No enlarged cervical lymph nodes    Respiratory:   The lungs were clear to auscultation bilaterally. There were no rales, rhonchi or wheezes. There was good respiratory effort. Cardiovascular: The heart was in a regular rhythm and rate was normal.  No murmur or extra sounds were noted. Breast:  The breasts are normal size and symmetrical.  There are no skin changes with position changes. The nipples are without deviations or discharge. No masses were palpated. No axillary or supraclavicular lymphadenopathy is present. Slight breast pain with palpation of palpation of right outer quad of right breast. No masses noted. Back:  Straight with no CVA tenderness present    Abdomen: The abdomen is soft and non-tender. There was no guarding, rebound or rigidity. The bladder was without fullness or tenderness. No hernias were appreciated. Pelvic: The external genitalia has a normal appearance without masses or lesions. There is no inguinal lymphadenopathy. Bladder/urethra without discharge or mass. Normal urethra. spec deferred through shared decision making. The uterus is midline, mobile, normal size/shape and non-tender. The adnexa are without masses or tenderness. Rectum is normal.    Musculoskeletal: Normal gait. No contractions with normal movement of all extremities. No cyanosis or edema present    Psychiatric:  Alert, oriented to time, place, person and situation. There are no mood or affect changes. ASSESSMENT/PLAN:     Routine annual gynecological exam  .1. Women's annual routine gynecological examination  Pap/HPV neg 2018 due 2023    2. Stress incontinence  - [facility] [place]    3. Vaginal odor  - VAGINITIS DNA PROBE; Future    4. Encounter for screening and preventative care  - Lipid Panel; Future  - Comprehensive Metabolic Panel; Future  - TSH With Reflex Ft4; Future  - CBC Auto Differential; Future  - Hemoglobin A1C; Future    5.  Encounter for screening mammogram for malignant neoplasm of breast  - [facility] DIGITAL SCREEN W OR WO CAD BILATERAL; Future    6. Encounter for surveillance of contraceptive pills  - norethindrone-ethinyl estradiol (BLISOVI FE 1/20) 1-20 MG-MCG per tablet; TAKE 1 TABLET DAILY  Dispense: 84 tablet; Refill: 5    7. Dysmenorrhea  - norethindrone-ethinyl estradiol (BLISOVI FE 1/20) 1-20 MG-MCG per tablet; TAKE 1 TABLET DAILY  Dispense: 84 tablet; Refill: 5    8. Breast pain, right  -Pt reports frequent lifting with new job, no masses palpated. Likely muscle skeletal. Discussed if persists pt will call for dx mammogram. Is due for screening mammogram         Counseling Completed:    discussed need for repeat pap as per American Society for Colposcopy and Cervical Pathology guidelines. discussed need for mammograms every 1 year, If >44 yo and last mammogram was negative. discussed birth control and barrier recommendations. discussed STD counseling and prevention. discussed Gardisil counseling for all patients 9-25 yo. Hereditary Breast, Ovarian, Colon and Uterine Cancer screening discussed. Tobacco & Secondary smoke risks discussed; with recommendation for cessation and avoidance. Routine health maintenance per patients PCP discussed. Patient was seen with total face to face time of 25 minutes. More than 50% of this visit was counseling and education regarding    Diagnosis Orders   1. Women's annual routine gynecological examination     2. Stress incontinence  [facility] [place]   3. Vaginal odor  VAGINITIS DNA PROBE   4. Encounter for screening and preventative care  Lipid Panel    Comprehensive Metabolic Panel    TSH With Reflex Ft4    CBC Auto Differential    Hemoglobin A1C   5. Encounter for screening mammogram for malignant neoplasm of breast  [facility] DIGITAL SCREEN W OR WO CAD BILATERAL   6. Encounter for surveillance of contraceptive pills  norethindrone-ethinyl estradiol (BLISOVI FE 1/20) 1-20 MG-MCG per tablet   7.  Dysmenorrhea  norethindrone-ethinyl estradiol (BLISOVI FE 1/20) 1-20 MG-MCG per tablet   8.  Breast pain, right         Electronically signed by Chyna Giles on 12/16/21 at 1:14 PM EST

## 2021-12-17 DIAGNOSIS — N89.8 VAGINAL ODOR: ICD-10-CM

## 2021-12-17 LAB
DIRECT EXAM: ABNORMAL
Lab: ABNORMAL
SPECIMEN DESCRIPTION: ABNORMAL

## 2021-12-17 ASSESSMENT — ANXIETY QUESTIONNAIRES
2. NOT BEING ABLE TO STOP OR CONTROL WORRYING: 2
4. TROUBLE RELAXING: 0
7. FEELING AFRAID AS IF SOMETHING AWFUL MIGHT HAPPEN: 2
6. BECOMING EASILY ANNOYED OR IRRITABLE: 0
3. WORRYING TOO MUCH ABOUT DIFFERENT THINGS: 2
5. BEING SO RESTLESS THAT IT IS HARD TO SIT STILL: 0
1. FEELING NERVOUS, ANXIOUS, OR ON EDGE: 2
IF YOU CHECKED OFF ANY PROBLEMS ON THIS QUESTIONNAIRE, HOW DIFFICULT HAVE THESE PROBLEMS MADE IT FOR YOU TO DO YOUR WORK, TAKE CARE OF THINGS AT HOME, OR GET ALONG WITH OTHER PEOPLE: NOT DIFFICULT AT ALL
GAD7 TOTAL SCORE: 8

## 2021-12-17 ASSESSMENT — PATIENT HEALTH QUESTIONNAIRE - PHQ9
1. LITTLE INTEREST OR PLEASURE IN DOING THINGS: 0
SUM OF ALL RESPONSES TO PHQ QUESTIONS 1-9: 0

## 2021-12-20 ENCOUNTER — TELEPHONE (OUTPATIENT)
Dept: OBGYN CLINIC | Age: 41
End: 2021-12-20

## 2021-12-20 DIAGNOSIS — B96.89 BV (BACTERIAL VAGINOSIS): Primary | ICD-10-CM

## 2021-12-20 DIAGNOSIS — N76.0 BV (BACTERIAL VAGINOSIS): Primary | ICD-10-CM

## 2021-12-20 DIAGNOSIS — B37.9 YEAST INFECTION: ICD-10-CM

## 2021-12-20 RX ORDER — FLUCONAZOLE 150 MG/1
150 TABLET ORAL ONCE
Qty: 1 TABLET | Refills: 0 | Status: SHIPPED | OUTPATIENT
Start: 2021-12-20 | End: 2021-12-20

## 2021-12-20 RX ORDER — METRONIDAZOLE 500 MG/1
500 TABLET ORAL 2 TIMES DAILY
Qty: 14 TABLET | Refills: 0 | Status: SHIPPED | OUTPATIENT
Start: 2021-12-20 | End: 2021-12-27

## 2021-12-20 ASSESSMENT — ENCOUNTER SYMPTOMS
RESPIRATORY NEGATIVE: 1
ALLERGIC/IMMUNOLOGIC NEGATIVE: 1
EYES NEGATIVE: 1
GASTROINTESTINAL NEGATIVE: 1

## 2022-01-25 ENCOUNTER — TELEPHONE (OUTPATIENT)
Dept: OBGYN CLINIC | Age: 42
End: 2022-01-25

## 2022-05-19 ENCOUNTER — OFFICE VISIT (OUTPATIENT)
Dept: FAMILY MEDICINE CLINIC | Age: 42
End: 2022-05-19
Payer: COMMERCIAL

## 2022-05-19 VITALS
OXYGEN SATURATION: 97 % | HEART RATE: 79 BPM | BODY MASS INDEX: 32.95 KG/M2 | HEIGHT: 66 IN | SYSTOLIC BLOOD PRESSURE: 112 MMHG | WEIGHT: 205 LBS | DIASTOLIC BLOOD PRESSURE: 70 MMHG | TEMPERATURE: 96.5 F

## 2022-05-19 DIAGNOSIS — Z00.00 ENCOUNTER FOR WELL ADULT EXAM WITHOUT ABNORMAL FINDINGS: Primary | ICD-10-CM

## 2022-05-19 PROCEDURE — 99396 PREV VISIT EST AGE 40-64: CPT | Performed by: NURSE PRACTITIONER

## 2022-05-19 NOTE — PATIENT INSTRUCTIONS
Well Visit, Ages 25 to 48: Care Instructions  Overview     Well visits can help you stay healthy. Your doctor has checked your overall health and may have suggested ways to take good care of yourself. Your doctor also may have recommended tests. At home, you can help prevent illness withhealthy eating, regular exercise, and other steps. Follow-up care is a key part of your treatment and safety. Be sure to make and go to all appointments, and call your doctor if you are having problems. It's also a good idea to know your test results and keep alist of the medicines you take. How can you care for yourself at home?  Get screening tests that you and your doctor decide on. Screening helps find diseases before any symptoms appear.  Eat healthy foods. Choose fruits, vegetables, whole grains, protein, and low-fat dairy foods. Limit fat, especially saturated fat. Reduce salt in your diet.  Limit alcohol. If you are a man, have no more than 2 drinks a day or 14 drinks a week. If you are a woman, have no more than 1 drink a day or 7 drinks a week.  Get at least 30 minutes of physical activity on most days of the week. Walking is a good choice. You also may want to do other activities, such as running, swimming, cycling, or playing tennis or team sports. Discuss any changes in your exercise program with your doctor.  Reach and stay at a healthy weight. This will lower your risk for many problems, such as obesity, diabetes, heart disease, and high blood pressure.  Do not smoke or allow others to smoke around you. If you need help quitting, talk to your doctor about stop-smoking programs and medicines. These can increase your chances of quitting for good.  Care for your mental health. It is easy to get weighed down by worry and stress. Learn strategies to manage stress, like deep breathing and mindfulness, and stay connected with your family and community.  If you find you often feel sad or hopeless, talk questions about a medical condition or this instruction, always ask your healthcare professional. [place] [address] any warranty or liability for your use of this information.

## 2022-05-19 NOTE — PROGRESS NOTES
Well Adult Note  Name: Ousmane Aguilera Date: 2022   MRN: [id number] Sex: Female   Age: 38 y.o. Ethnicity: Non- / Non    : 1980 Race: Black /       Mckinley Martines is here for well adult exam.  History:    Has been doing well. Exercise: no- stays busy with work  Diet: Eats a balanced diet- eating more vegetables  Smoker: No  Alcohol: occasionally  Sleep: Averages 5-6.5 hours      Review of Systems   All other systems reviewed and are negative. Allergies   Allergen Reactions    Seasonal          Prior to Visit Medications    Medication Sig Taking? Authorizing Provider   ibuprofen (ADVIL;MOTRIN) 600 MG tablet Take 600 mg by mouth every 6 hours as needed for Pain Yes Historical Provider, MD   buPROPion (WELLBUTRIN XL) 150 MG extended release tablet  Yes Historical Provider, MD   acetaminophen (TYLENOL 8 HOUR ARTHRITIS PAIN) 650 MG extended release tablet Take 650 mg by mouth every 8 hours as needed for Pain Yes Historical Provider, MD   Multiple Vitamins-Minerals (MULTIVITAMIN ADULT PO) Take by mouth Yes Historical Provider, MD         Past Medical History:   Diagnosis Date    Abnormal Pap smear of cervix     Allergic rhinitis     Depression     in the past    Headache        History reviewed. No pertinent surgical history.       Family History   Problem Relation Age of Onset    High Blood Pressure Mother     Diabetes Mother     High Cholesterol Father     Breast Cancer Maternal Grandmother         >50    Ovarian Cancer Neg Hx     Colon Cancer Neg Hx        Social History     Tobacco Use    Smoking status: Never Smoker    Smokeless tobacco: Never Used   Vaping Use    Vaping Use: Never used   Substance Use Topics    Alcohol use: Yes     Comment: Social    Drug use: No       Objective   /70 (Site: Left Upper Arm, Position: Sitting, Cuff Size: Large Adult)   Pulse 79   Temp 96.5 °F (35.8 °C) (Temporal)   Ht 5' 6\" (1.676 m)   Wt 205 lb (93 kg)   SpO2 97%   BMI 33.09 kg/m²   Wt Readings from Last 3 Encounters:   05/19/22 205 lb (93 kg)   12/16/21 201 lb 6.4 oz (91.4 kg)   10/19/20 206 lb (93.4 kg)     There were no vitals filed for this visit. Physical Exam  Constitutional:       Appearance: Normal appearance. She is obese. HENT:      Head: Normocephalic and atraumatic. Eyes:      Extraocular Movements: Extraocular movements intact. Conjunctiva/sclera: Conjunctivae normal.      Pupils: Pupils are equal, round, and reactive to light. Cardiovascular:      Rate and Rhythm: Normal rate and regular rhythm. Pulses: Normal pulses. Heart sounds: Normal heart sounds. Pulmonary:      Effort: Pulmonary effort is normal.      Breath sounds: Normal breath sounds. Abdominal:      General: Abdomen is flat. Bowel sounds are normal.   Musculoskeletal:         General: Normal range of motion. Cervical back: Normal range of motion and neck supple. Skin:     General: Skin is warm and dry. Neurological:      Mental Status: She is alert and oriented to person, place, and time. Psychiatric:         Mood and Affect: Mood normal.         Behavior: Behavior normal.         Thought Content: Thought content normal.         Judgment: Judgment normal.           Assessment   Plan   1. Encounter for well adult exam without abnormal findings  -     CBC with Auto Differential; Future  -     Comprehensive Metabolic Panel; Future  -     TSH with Reflex; Future  -     Lipid, Fasting;  Future         Personalized Preventive Plan   Current Health Maintenance Status  Immunization History   Administered Date(s) Administered    COVID-19, SmartFocus top, DO NOT Dilute, Enrique-Sucrose, 12+ yrs, PF, 30 mcg/0.3 mL dose 02/10/2022    COVID-19, Pfizer Purple top, DILUTE for use, 12+ yrs, 30mcg/0.3mL dose 04/30/2021, 05/21/2021        Health Maintenance   Topic Date Due    DTaP/Tdap/Td vaccine (1 - Tdap) Never done    Diabetes screen  Never done   [place] Lipids  Never done    Varicella vaccine (1 of 2 - 2-dose childhood series) 06/09/2022 (Originally 3/24/1983)    Hepatitis C screen  05/19/2023 (Originally 3/24/2000)    HIV screen  05/19/2023 (Originally 3/24/1997)    Flu vaccine (Season Ended) 09/01/2022    Depression Screen  12/17/2022    Cervical cancer screen  03/13/2023    COVID-19 Vaccine  Completed    Hepatitis A vaccine  Aged Out    Hepatitis B vaccine  Aged Out    Hib vaccine  Aged Out    Meningococcal (ACWY) vaccine  Aged Out    Pneumococcal 0-64 years Vaccine  Aged Out     Recommendations for [employer] Due: see orders and patient instructions/AVS.    Return in about 1 year (around 5/19/2023) for annual physical.

## 2022-05-20 ENCOUNTER — HOSPITAL ENCOUNTER (OUTPATIENT)
Age: 42
Setting detail: SPECIMEN
Discharge: HOME | End: 2022-05-20

## 2022-05-20 DIAGNOSIS — Z00.00 ENCOUNTER FOR WELL ADULT EXAM WITHOUT ABNORMAL FINDINGS: ICD-10-CM

## 2022-05-20 LAB
ABSOLUTE EOS #: 0.16 K/UL (ref 0–0.44)
ABSOLUTE IMMATURE GRANULOCYTE: 0.04 K/UL (ref 0–0.3)
ABSOLUTE LYMPH #: 3.32 K/UL (ref 1.1–3.7)
ABSOLUTE MONO #: 0.79 K/UL (ref 0.1–1.2)
ALBUMIN SERPL-MCNC: 4.5 G/DL (ref 3.5–5.2)
ALBUMIN/GLOBULIN RATIO: 1.3 (ref 1–2.5)
ALP BLD-CCNC: 65 U/L (ref 35–104)
ALT SERPL-CCNC: 16 U/L (ref 5–33)
ANION GAP SERPL CALCULATED.3IONS-SCNC: 14 MMOL/L (ref 9–17)
AST SERPL-CCNC: 20 U/L
BASOPHILS # BLD: 0 % (ref 0–2)
BASOPHILS ABSOLUTE: 0.03 K/UL (ref 0–0.2)
BILIRUB SERPL-MCNC: 0.32 MG/DL (ref 0.3–1.2)
BUN BLDV-MCNC: 8 MG/DL (ref 6–20)
CALCIUM SERPL-MCNC: 9.3 MG/DL (ref 8.6–10.4)
CHLORIDE BLD-SCNC: 99 MMOL/L (ref 98–107)
CHOLESTEROL, FASTING: 188 MG/DL
CHOLESTEROL/HDL RATIO: 3.4
CO2: 23 MMOL/L (ref 20–31)
CREAT SERPL-MCNC: 0.74 MG/DL (ref 0.5–0.9)
EOSINOPHILS RELATIVE PERCENT: 2 % (ref 1–4)
GFR AFRICAN AMERICAN: >60 ML/MIN
GFR NON-AFRICAN AMERICAN: >60 ML/MIN
GFR SERPL CREATININE-BSD FRML MDRD: NORMAL ML/MIN/{1.73_M2}
GLUCOSE BLD-MCNC: 86 MG/DL (ref 70–99)
HCT VFR BLD CALC: 44.3 % (ref 36.3–47.1)
HDLC SERPL-MCNC: 55 MG/DL
HEMOGLOBIN: 14 G/DL (ref 11.9–15.1)
IMMATURE GRANULOCYTES: 0 %
LDL CHOLESTEROL: 99 MG/DL (ref 0–130)
LYMPHOCYTES # BLD: 30 % (ref 24–43)
MCH RBC QN AUTO: 26.4 PG (ref 25.2–33.5)
MCHC RBC AUTO-ENTMCNC: 31.6 G/DL (ref 28.4–34.8)
MCV RBC AUTO: 83.4 FL (ref 82.6–102.9)
MONOCYTES # BLD: 7 % (ref 3–12)
NRBC AUTOMATED: 0 PER 100 WBC
PDW BLD-RTO: 13.5 % (ref 11.8–14.4)
PLATELET # BLD: 324 K/UL (ref 138–453)
PMV BLD AUTO: 11.8 FL (ref 8.1–13.5)
POTASSIUM SERPL-SCNC: 3.9 MMOL/L (ref 3.7–5.3)
RBC # BLD: 5.31 M/UL (ref 3.95–5.11)
SEG NEUTROPHILS: 61 % (ref 36–65)
SEGMENTED NEUTROPHILS ABSOLUTE COUNT: 6.58 K/UL (ref 1.5–8.1)
SODIUM BLD-SCNC: 136 MMOL/L (ref 135–144)
TOTAL PROTEIN: 7.9 G/DL (ref 6.4–8.3)
TRIGLYCERIDE, FASTING: 171 MG/DL
TSH SERPL DL<=0.05 MIU/L-ACNC: 1.21 UIU/ML (ref 0.3–5)
WBC # BLD: 10.9 K/UL (ref 3.5–11.3)

## 2022-08-06 DIAGNOSIS — Z30.41 ENCOUNTER FOR SURVEILLANCE OF CONTRACEPTIVE PILLS: Primary | ICD-10-CM

## 2022-08-08 RX ORDER — NORETHINDRONE ACETATE AND ETHINYL ESTRADIOL 1MG-20(21)
KIT ORAL
Qty: 84 TABLET | Refills: 0 | Status: SHIPPED | OUTPATIENT
Start: 2022-08-08 | End: 2022-10-31

## 2022-08-08 NOTE — TELEPHONE ENCOUNTER
Ana Darling is requesting a refill on blisovi.      Last Annual visit:  12/16/21  Next Office visit:  none on file, mailbox was full    Currently Pregnant no  Last OB visit: n/a  Next OB visit: n/a    Last prescribing provider:  Alvin marie

## 2022-10-31 ENCOUNTER — HOSPITAL ENCOUNTER (EMERGENCY)
Age: 42
Discharge: HOME | End: 2022-10-31
Attending: EMERGENCY MEDICINE
Payer: OTHER MISCELLANEOUS

## 2022-10-31 VITALS
WEIGHT: 210 LBS | HEIGHT: 66 IN | SYSTOLIC BLOOD PRESSURE: 133 MMHG | HEART RATE: 79 BPM | BODY MASS INDEX: 33.75 KG/M2 | RESPIRATION RATE: 18 BRPM | TEMPERATURE: 98.1 F | OXYGEN SATURATION: 96 % | DIASTOLIC BLOOD PRESSURE: 89 MMHG
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
## 2022-10-31 DIAGNOSIS — S39.012A BACK STRAIN, INITIAL ENCOUNTER: Primary | ICD-10-CM

## 2022-10-31 DIAGNOSIS — S16.1XXA STRAIN OF NECK MUSCLE, INITIAL ENCOUNTER: ICD-10-CM

## 2022-10-31 DIAGNOSIS — Z30.41 ENCOUNTER FOR SURVEILLANCE OF CONTRACEPTIVE PILLS: ICD-10-CM

## 2022-10-31 PROCEDURE — 99282 EMERGENCY DEPT VISIT SF MDM: CPT

## 2022-10-31 RX ORDER — NORETHINDRONE ACETATE AND ETHINYL ESTRADIOL 1MG-20(21)
KIT ORAL
Qty: 84 TABLET | Refills: 3 | Status: SHIPPED | OUTPATIENT
Start: 2022-10-31

## 2022-10-31 ASSESSMENT — PAIN DESCRIPTION - ORIENTATION: ORIENTATION: LOWER

## 2022-10-31 ASSESSMENT — PAIN DESCRIPTION - LOCATION: LOCATION: BACK

## 2022-10-31 ASSESSMENT — PAIN - FUNCTIONAL ASSESSMENT: PAIN_FUNCTIONAL_ASSESSMENT: 0-10

## 2022-10-31 ASSESSMENT — PAIN SCALES - GENERAL: PAINLEVEL_OUTOF10: 6

## 2022-10-31 NOTE — Clinical Note
Brittney Hilton was seen and treated in our emergency department on 10/31/2022. She may return to work on 11/01/2022. If you have any questions or concerns, please don't hesitate to call.       Hermilo Quintero MD
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
## 2022-10-31 NOTE — TELEPHONE ENCOUNTER
Durga Gallegos is requesting a refill on blisovi.      Last Annual visit:  12/16/21  Next Office visit:  none on file, lmom    Currently Pregnant no  Last OB visit: n/a  Next OB visit: n/a    Last prescribing provider:  Corrie Worthington

## 2023-05-05 ENCOUNTER — OFFICE VISIT (OUTPATIENT)
Dept: FAMILY MEDICINE CLINIC | Age: 43
End: 2023-05-05
Payer: COMMERCIAL

## 2023-05-05 VITALS
SYSTOLIC BLOOD PRESSURE: 120 MMHG | TEMPERATURE: 97.9 F | BODY MASS INDEX: 33.8 KG/M2 | DIASTOLIC BLOOD PRESSURE: 80 MMHG | OXYGEN SATURATION: 97 % | WEIGHT: 209.4 LBS | HEART RATE: 77 BPM

## 2023-05-05 DIAGNOSIS — R45.1 AGITATION: Primary | ICD-10-CM

## 2023-05-05 DIAGNOSIS — F43.10 PTSD (POST-TRAUMATIC STRESS DISORDER): ICD-10-CM

## 2023-05-05 PROCEDURE — 99213 OFFICE O/P EST LOW 20 MIN: CPT | Performed by: NURSE PRACTITIONER

## 2023-05-05 RX ORDER — BUPROPION HYDROCHLORIDE 150 MG/1
150 TABLET ORAL EVERY MORNING
Qty: 30 TABLET | Refills: 0 | Status: SHIPPED | OUTPATIENT
Start: 2023-05-05

## 2023-05-05 RX ORDER — BUPROPION HYDROCHLORIDE 150 MG/1
150 TABLET ORAL EVERY MORNING
Qty: 30 TABLET | Refills: 0 | Status: SHIPPED | OUTPATIENT
Start: 2023-05-05 | End: 2023-05-05

## 2023-05-05 SDOH — ECONOMIC STABILITY: FOOD INSECURITY: WITHIN THE PAST 12 MONTHS, YOU WORRIED THAT YOUR FOOD WOULD RUN OUT BEFORE YOU GOT MONEY TO BUY MORE.: SOMETIMES TRUE

## 2023-05-05 SDOH — ECONOMIC STABILITY: INCOME INSECURITY: HOW HARD IS IT FOR YOU TO PAY FOR THE VERY BASICS LIKE FOOD, HOUSING, MEDICAL CARE, AND HEATING?: NOT HARD AT ALL

## 2023-05-05 SDOH — ECONOMIC STABILITY: FOOD INSECURITY: WITHIN THE PAST 12 MONTHS, YOU WORRIED THAT YOUR FOOD WOULD RUN OUT BEFORE YOU GOT MONEY TO BUY MORE.: NEVER TRUE

## 2023-05-05 SDOH — ECONOMIC STABILITY: FOOD INSECURITY: WITHIN THE PAST 12 MONTHS, THE FOOD YOU BOUGHT JUST DIDN'T LAST AND YOU DIDN'T HAVE MONEY TO GET MORE.: SOMETIMES TRUE

## 2023-05-05 SDOH — ECONOMIC STABILITY: HOUSING INSECURITY
IN THE LAST 12 MONTHS, WAS THERE A TIME WHEN YOU DID NOT HAVE A STEADY PLACE TO SLEEP OR SLEPT IN A SHELTER (INCLUDING NOW)?: NO

## 2023-05-05 SDOH — ECONOMIC STABILITY: FOOD INSECURITY: WITHIN THE PAST 12 MONTHS, THE FOOD YOU BOUGHT JUST DIDN'T LAST AND YOU DIDN'T HAVE MONEY TO GET MORE.: NEVER TRUE

## 2023-05-05 SDOH — ECONOMIC STABILITY: INCOME INSECURITY: HOW HARD IS IT FOR YOU TO PAY FOR THE VERY BASICS LIKE FOOD, HOUSING, MEDICAL CARE, AND HEATING?: SOMEWHAT HARD

## 2023-05-05 ASSESSMENT — PATIENT HEALTH QUESTIONNAIRE - PHQ9
SUM OF ALL RESPONSES TO PHQ QUESTIONS 1-9: 2
1. LITTLE INTEREST OR PLEASURE IN DOING THINGS: 1
SUM OF ALL RESPONSES TO PHQ QUESTIONS 1-9: 2
SUM OF ALL RESPONSES TO PHQ QUESTIONS 1-9: 2
2. FEELING DOWN, DEPRESSED OR HOPELESS: 1
SUM OF ALL RESPONSES TO PHQ QUESTIONS 1-9: 2
SUM OF ALL RESPONSES TO PHQ9 QUESTIONS 1 & 2: 2

## 2023-05-05 NOTE — PROGRESS NOTES
[place] [address]  [address] [facility] [place]. [address]  [phone]  [phone]    Alma Brady is a 41 y.o. female who is here with c/o of:    Chief Complaint: Stress (PTSD. Wants referral for massage therapy)      Patient Accompanied by: n/a    HPI - Alma Brady is here today with c/o:    Patient here for re evaluation of her mood. Follows with Psychiatrist and therapist at [facility] for PTSD. Says that she feels like more recently she is agitated. Says that she will get upset very easily. She also says that sometimes it takes her a while to go into work due to they way she is feeling. Says that she is very stressed. Compliant with Wellbutrin. Health Maintenance Due   Topic Date Due    Varicella vaccine (1 of 2 - 2-dose childhood series) Never done    DTaP/Tdap/Td vaccine (1 - Tdap) Never done    COVID-19 Vaccine (4 - Booster for Pfizer series) 04/07/2022    Depression Screen  12/17/2022    Cervical cancer screen  03/13/2023        Patient Active Problem List:     HSV infection     History of genital warts     Past Medical History:   Diagnosis Date    Abnormal Pap smear of cervix     Allergic rhinitis     Depression     in the past    Headache       History reviewed. No pertinent surgical history. Family History   Problem Relation Age of Onset    High Blood Pressure Mother     Diabetes Mother     High Cholesterol Father     Breast Cancer Maternal Grandmother         >50    Ovarian Cancer Neg Hx     Colon Cancer Neg Hx      Social History     Tobacco Use    Smoking status: Never    Smokeless tobacco: Never   Substance Use Topics    Alcohol use: Yes     Comment: Social     ALLERGIES:    Allergies   Allergen Reactions    Seasonal           Subjective   Review of Systems   Psychiatric/Behavioral:  Positive for agitation. All other systems reviewed and are negative.    Constitutional:  Negative for activity change, appetite change,unexpected weight

## 2023-09-01 DIAGNOSIS — F43.10 PTSD (POST-TRAUMATIC STRESS DISORDER): ICD-10-CM

## 2023-09-01 RX ORDER — BUPROPION HYDROCHLORIDE 150 MG/1
TABLET ORAL
Qty: 30 TABLET | Refills: 0 | Status: SHIPPED | OUTPATIENT
Start: 2023-09-01

## 2023-09-01 NOTE — TELEPHONE ENCOUNTER
Analy Wagner is calling to request a refill on the following medication(s):    Medication Request:  Requested Prescriptions     Pending Prescriptions Disp Refills    buPROPion (WELLBUTRIN XL) 150 MG extended release tablet [Pharmacy Med Name: buPROPion HCL  MG TABLET] 30 tablet 0     Sig: TAKE ONE TABLET BY MOUTH EVERY MORNING       Last Visit Date (If Applicable):  7/6/3591    Next Visit Date:    Visit date not found

## 2023-10-13 ENCOUNTER — TELEPHONE (OUTPATIENT)
Dept: FAMILY MEDICINE CLINIC | Age: 43
End: 2023-10-13

## 2023-10-13 NOTE — TELEPHONE ENCOUNTER
----- Message from [facility] sent at 10/12/2023  3:41 PM EDT -----  Subject: Appointment Request    Reason for Call: Established Patient Appointment needed: Routine ED Follow   Up Visit    QUESTIONS    Reason for appointment request? No appointments available during search     Additional Information for Provider? Patient called in to reschedule her   ED/ER follow up in regards to sharp pains in her chest and cold sweats. Available appointments does not meet patient needs. Patient would like an   appointment for a Thursday.  Please contact patient to further assist.   ---------------------------------------------------------------------------  --------------  Ray PRATER  [phone]; OK to leave message on voicemail  ---------------------------------------------------------------------------  --------------  SCRIPT ANSWERS

## 2023-10-16 ENCOUNTER — TELEPHONE (OUTPATIENT)
Dept: FAMILY MEDICINE CLINIC | Age: 43
End: 2023-10-16

## 2023-10-16 NOTE — TELEPHONE ENCOUNTER
----- Message from [facility] sent at 10/12/2023  3:41 PM EDT -----  Subject: Appointment Request    Reason for Call: Established Patient Appointment needed: Routine ED Follow   Up Visit    QUESTIONS    Reason for appointment request? No appointments available during search     Additional Information for Provider? Patient called in to reschedule her   ED/ER follow up in regards to sharp pains in her chest and cold sweats. Available appointments does not meet patient needs. Patient would like an   appointment for a Thursday.  Please contact patient to further assist.   ---------------------------------------------------------------------------  --------------  Ilan PRATER  [phone]; OK to leave message on voicemail  ---------------------------------------------------------------------------  --------------  SCRIPT ANSWERS

## 2024-10-18 ENCOUNTER — PATIENT MESSAGE (OUTPATIENT)
Dept: FAMILY MEDICINE CLINIC | Age: 44
End: 2024-10-18

## 2024-11-15 ENCOUNTER — OFFICE VISIT (OUTPATIENT)
Dept: FAMILY MEDICINE CLINIC | Age: 44
End: 2024-11-15
Payer: COMMERCIAL

## 2024-11-15 VITALS
TEMPERATURE: 97.3 F | SYSTOLIC BLOOD PRESSURE: 120 MMHG | WEIGHT: 206 LBS | HEART RATE: 80 BPM | BODY MASS INDEX: 33.25 KG/M2 | DIASTOLIC BLOOD PRESSURE: 86 MMHG | OXYGEN SATURATION: 96 %

## 2024-11-15 DIAGNOSIS — Z11.59 NEED FOR HEPATITIS C SCREENING TEST: ICD-10-CM

## 2024-11-15 DIAGNOSIS — Z12.31 ENCOUNTER FOR SCREENING MAMMOGRAM FOR BREAST CANCER: ICD-10-CM

## 2024-11-15 DIAGNOSIS — Z23 IMMUNIZATION DUE: ICD-10-CM

## 2024-11-15 DIAGNOSIS — Z00.00 ENCOUNTER FOR WELL ADULT EXAM WITHOUT ABNORMAL FINDINGS: Primary | ICD-10-CM

## 2024-11-15 DIAGNOSIS — B37.9 YEAST INFECTION: ICD-10-CM

## 2024-11-15 PROCEDURE — 90471 IMMUNIZATION ADMIN: CPT | Performed by: NURSE PRACTITIONER

## 2024-11-15 PROCEDURE — 90715 TDAP VACCINE 7 YRS/> IM: CPT | Performed by: NURSE PRACTITIONER

## 2024-11-15 PROCEDURE — 99396 PREV VISIT EST AGE 40-64: CPT | Performed by: NURSE PRACTITIONER

## 2024-11-15 RX ORDER — GUANFACINE 1 MG/1
1 TABLET ORAL NIGHTLY
COMMUNITY

## 2024-11-15 RX ORDER — FLUCONAZOLE 150 MG/1
150 TABLET ORAL ONCE
Qty: 1 TABLET | Refills: 0 | Status: SHIPPED | OUTPATIENT
Start: 2024-11-15 | End: 2024-11-15

## 2024-11-15 RX ORDER — HYDROXYZINE HYDROCHLORIDE 25 MG/1
TABLET, FILM COATED ORAL
COMMUNITY
Start: 2024-10-18

## 2024-11-15 SDOH — ECONOMIC STABILITY: INCOME INSECURITY: HOW HARD IS IT FOR YOU TO PAY FOR THE VERY BASICS LIKE FOOD, HOUSING, MEDICAL CARE, AND HEATING?: NOT HARD AT ALL

## 2024-11-15 SDOH — ECONOMIC STABILITY: FOOD INSECURITY: WITHIN THE PAST 12 MONTHS, YOU WORRIED THAT YOUR FOOD WOULD RUN OUT BEFORE YOU GOT MONEY TO BUY MORE.: OFTEN TRUE

## 2024-11-15 ASSESSMENT — PATIENT HEALTH QUESTIONNAIRE - PHQ9
3. TROUBLE FALLING OR STAYING ASLEEP: MORE THAN HALF THE DAYS
1. LITTLE INTEREST OR PLEASURE IN DOING THINGS: MORE THAN HALF THE DAYS
SUM OF ALL RESPONSES TO PHQ QUESTIONS 1-9: 13
2. FEELING DOWN, DEPRESSED OR HOPELESS: SEVERAL DAYS
10. IF YOU CHECKED OFF ANY PROBLEMS, HOW DIFFICULT HAVE THESE PROBLEMS MADE IT FOR YOU TO DO YOUR WORK, TAKE CARE OF THINGS AT HOME, OR GET ALONG WITH OTHER PEOPLE: NOT DIFFICULT AT ALL
SUM OF ALL RESPONSES TO PHQ QUESTIONS 1-9: 13
5. POOR APPETITE OR OVEREATING: MORE THAN HALF THE DAYS
7. TROUBLE CONCENTRATING ON THINGS, SUCH AS READING THE NEWSPAPER OR WATCHING TELEVISION: MORE THAN HALF THE DAYS
SUM OF ALL RESPONSES TO PHQ QUESTIONS 1-9: 13
SUM OF ALL RESPONSES TO PHQ QUESTIONS 1-9: 13
SUM OF ALL RESPONSES TO PHQ9 QUESTIONS 1 & 2: 3
4. FEELING TIRED OR HAVING LITTLE ENERGY: MORE THAN HALF THE DAYS
6. FEELING BAD ABOUT YOURSELF - OR THAT YOU ARE A FAILURE OR HAVE LET YOURSELF OR YOUR FAMILY DOWN: MORE THAN HALF THE DAYS
8. MOVING OR SPEAKING SO SLOWLY THAT OTHER PEOPLE COULD HAVE NOTICED. OR THE OPPOSITE, BEING SO FIGETY OR RESTLESS THAT YOU HAVE BEEN MOVING AROUND A LOT MORE THAN USUAL: NOT AT ALL
9. THOUGHTS THAT YOU WOULD BE BETTER OFF DEAD, OR OF HURTING YOURSELF: NOT AT ALL

## 2024-11-15 NOTE — PATIENT INSTRUCTIONS
[facility] Food Resources*  (Call [facility] for more resources)        [facility] Food Ministry  What they offer: Food pantry second and fourth Tuesday 4:30-6pm  Phone Number and Address: 672.286.7247 Toll Free: [employer] at [employer], between [place] and [employer]; [address] [address]  [facility] Office on Aging of [facility]  What they offer: “Meals & Nutrition” search option on website  Phone Number and Website: 971.548.4292; https://"[employer]/  [facility] Ministries [facility] Café  What they offer: Dinner is open to all (must be registered and in good standing) and three hot meals a day for shelter residents. Breakfast 7-8am, Lunch 12-1pm, and Dinner 5-6pm daily.  Phone and Address: [phone]; [address] [address]  [employer] Last Mile Delivery program  What they offer: Food Box Delivery for those within [facility] who are homebound or without transportation. Applications done by phone. Qualifying individuals are eligible for 3 deliveries for the lifetime of the program.  Phone number: Call for eligibility check at 2-1-1 or 6-798-726ChangeTip (5396)  [facility]  What they offer: Food Pantry second and fourth Saturday 1-5pm  Phone and Address: [phone]; [address] [facility] [address]  [employer] Mobile Food Pantries   What they offer: Mobile pantries throughout the [facility]. Anyone in need may visit one pantry per month.  Phone Number and Website: For locations and schedule call 2-1-1 or 2-971-587-HELP (8367) or check the website www.feed[place]edo.org  [employer] [place] [employer] Charities  What they offer: Food Pantry, call for schedule, open to All  Phone Number: [phone]  [facility]  What they offer: Hot meals, Breakfast: Monday, Wednesday, Friday 8-10am, Lunch: Monday-Friday 10am-12:30pm. Food pantry (serves [address] or east of [facility])

## 2024-11-15 NOTE — PROGRESS NOTES
Well Adult Note  Name: Alexia Seay Today’s Date: 11/15/2024   MRN: [id number] Sex: Female   Age: 44 y.o. Ethnicity: Non- / Non    : 1980 Race: Black /       Alexia Seay is here for a well adult exam.          Assessment & Plan  1. Annual physical.  A mammogram has been ordered, and she will be contacted to schedule it. She has been advised to consult her gynecologist regarding her Pap smear. Blood work has been ordered, and she will be notified upon receipt of the results. A tetanus vaccine will be administered today.    2. Yeast infection.  A single dose of Diflucan has been prescribed. If the infection does not improve, a gynecological examination may be necessary to rule out other potential causes.    3. Medication management.  She is currently taking Wellbutrin, guanfacine, and another medication she referred to as \"hydro something.\" These medications are obtained from [place].    Follow-up  Return next year for her annual physical.    Encounter for well adult exam without abnormal findings  -     CBC with Auto Differential; Future  -     Comprehensive Metabolic Panel; Future  -     TSH with Reflex; Future  -     Lipid, Fasting; Future  -     Hepatitis B Surface Antibody; Future  -     Hepatitis B Surface Antigen; Future  Encounter for screening mammogram for breast cancer  -     KELLI Digital Screen Bilateral [[id number]]; Future  Need for hepatitis C screening test  -     Hepatitis C Antibody; Future  Yeast infection  -     fluconazole (DIFLUCAN) 150 MG tablet; Take 1 tablet by mouth once for 1 dose, Disp-1 tablet, R-0Normal  Results         No follow-ups on file.     Subjective   History of Present Illness  The patient is a 44-year-old female here for an annual physical.    She reports no changes in her medical, surgical, or family history since her last visit. She is due for a mammogram and is uncertain about the status of her Pap smears. She is unsure of her

## 2024-11-18 ENCOUNTER — TELEPHONE (OUTPATIENT)
Dept: FAMILY MEDICINE CLINIC | Age: 44
End: 2024-11-18

## 2024-11-18 NOTE — TELEPHONE ENCOUNTER
Alexia Seay was contacted by Fadumo Rios MA, a Community Health Navigator, regarding a Social Determinants of Health referral.     A message was left with the writer's contact information.    Follow-up attempt.

## 2024-11-19 NOTE — TELEPHONE ENCOUNTER
Alexia Seay was contacted by Fadumo Rios MA, a Community Health Navigator, regarding a Social Determinants of Health referral.     Patient answered the phone, but was unable to discuss SDOH needs at this time.    Second follow-up attempt. Patient is available for phone call on Thursday and Friday.

## 2024-11-21 NOTE — TELEPHONE ENCOUNTER
Alexia Seay was contacted by Fadumo Rios MA, a Community Health Navigator, regarding a Social Determinants of Health referral.     A message was left with the writer's contact information.    Final follow-up attempt.

## 2024-11-22 NOTE — TELEPHONE ENCOUNTER
Alexia Seay was contacted by a Community Health Navigator to discuss a referral for SDOH related needs.     Writer spoke with: Patient and explained the services and assistance that can be provided by a Community Health Navigator.     Patient agreeable to receiving resources and support from writer.     Intake Notes: Writer spoke with patient regarding current food needs. Patient is over income for SNAP benefits. Writer sent via patient email a list of food resources. Patient asked for phone number for obgyn and information was given from past appointment.Patient had no further needs at this time.    Actions to be completed by writer: Close referral.    Actions to be completed by patient: Close referral.      Fadumo Rios MA